# Patient Record
Sex: FEMALE | Race: WHITE | NOT HISPANIC OR LATINO | Employment: OTHER | URBAN - METROPOLITAN AREA
[De-identification: names, ages, dates, MRNs, and addresses within clinical notes are randomized per-mention and may not be internally consistent; named-entity substitution may affect disease eponyms.]

---

## 2017-03-10 ENCOUNTER — HISTORICAL (OUTPATIENT)
Dept: RADIOLOGY | Facility: HOSPITAL | Age: 57
End: 2017-03-10

## 2018-07-17 ENCOUNTER — HISTORICAL (OUTPATIENT)
Dept: ADMINISTRATIVE | Facility: HOSPITAL | Age: 58
End: 2018-07-17

## 2018-07-17 LAB
BUN SERPL-MCNC: 22 MG/DL (ref 7–18)
CALCIUM SERPL-MCNC: 9 MG/DL (ref 8.5–10.1)
CHLORIDE SERPL-SCNC: 108 MMOL/L (ref 98–107)
CO2 SERPL-SCNC: 27 MMOL/L (ref 21–32)
CREAT SERPL-MCNC: 0.9 MG/DL (ref 0.6–1.3)
CREAT/UREA NIT SERPL: 24
ERYTHROCYTE [DISTWIDTH] IN BLOOD BY AUTOMATED COUNT: 11.8 % (ref 11.5–14.5)
GLUCOSE SERPL-MCNC: 72 MG/DL (ref 74–106)
HCT VFR BLD AUTO: 43.6 % (ref 35–46)
HGB BLD-MCNC: 14.9 GM/DL (ref 12–16)
MCH RBC QN AUTO: 33.8 PG (ref 26–34)
MCHC RBC AUTO-ENTMCNC: 34.2 GM/DL (ref 31–37)
MCV RBC AUTO: 98.9 FL (ref 80–100)
PLATELET # BLD AUTO: 286 X10(3)/MCL (ref 130–400)
PMV BLD AUTO: 11.7 FL (ref 7.4–10.4)
POTASSIUM SERPL-SCNC: 4.2 MMOL/L (ref 3.5–5.1)
RBC # BLD AUTO: 4.41 X10(6)/MCL (ref 4–5.2)
SODIUM SERPL-SCNC: 141 MMOL/L (ref 136–145)
WBC # SPEC AUTO: 9 X10(3)/MCL (ref 4.5–11)

## 2018-07-23 ENCOUNTER — HISTORICAL (OUTPATIENT)
Dept: SURGERY | Facility: HOSPITAL | Age: 58
End: 2018-07-23

## 2018-07-23 LAB
AMPHET UR QL SCN: NEGATIVE
BARBITURATE SCN PRESENT UR: NEGATIVE
BENZODIAZ UR QL SCN: POSITIVE
CANNABINOIDS UR QL SCN: POSITIVE
COCAINE UR QL SCN: POSITIVE
OPIATES UR QL SCN: POSITIVE
PCP UR QL: NEGATIVE

## 2018-10-26 ENCOUNTER — HISTORICAL (OUTPATIENT)
Dept: LAB | Facility: HOSPITAL | Age: 58
End: 2018-10-26

## 2019-09-27 ENCOUNTER — HISTORICAL (OUTPATIENT)
Dept: ENDOSCOPY | Facility: HOSPITAL | Age: 59
End: 2019-09-27

## 2022-04-10 ENCOUNTER — HISTORICAL (OUTPATIENT)
Dept: ADMINISTRATIVE | Facility: HOSPITAL | Age: 62
End: 2022-04-10

## 2022-04-25 VITALS
OXYGEN SATURATION: 96 % | HEIGHT: 66 IN | SYSTOLIC BLOOD PRESSURE: 120 MMHG | WEIGHT: 165.38 LBS | BODY MASS INDEX: 26.58 KG/M2 | DIASTOLIC BLOOD PRESSURE: 79 MMHG

## 2022-05-03 NOTE — HISTORICAL [facility] CERNER
This is a historical note converted from Kristyn. Formatting and pictures may have been removed.  Please reference Kristyn for original formatting and attached multimedia. Interval H&P  Patient examined and chart reviewed.? No changes to the H&P below.? Ok to proceed with surgery.  ?   Margie Stephen  General Surgery, PGY-1  ?   Chief Complaint  Referred for hernia  History of Present Illness  58 year old female presents to clinic as a referral from [facility] ED for evaluation of ventral hernia. Patient states that she first noticed the hernia ~2 years ago. It was causing her minimal symptoms at that time. Recently she has started to be more tender in the area of the hernia and it is more difficult to reduce than before. She was evaluated in the [facility] ED last month, where a CT scan was obtained showing?a hernia containing inflamed omentum and a 1.5 cm defect. ?She was evaluated?by the surgical team, and outpatient evaluation for repair was recommended. She denies nausea/vomiting or changes in BMs. No fever chills. She has never had a colonoscopy.  Review of Systems  10 point ROS negative other than stated above in HPI  Physical Exam  ???Vitals & Measurements  ??T:?36.7? ?C (Oral)? HR:?71(Peripheral)? RR:?16? BP:?120/79? WT:?73.5?kg? WT:?73.5?kg?  Gen: A&Ox3, NAD  HEENT: NCAT  CV: RRR  Resp: Mild expiratory wheezes bilaterally. Non-labored breathing on room air. No use of accessory muscles.  Abd: Soft, ND. Epigastric hernia, reducible, with overlying tenderness to palpation. No associated skin changes. Defect <2 cm.  Ext: No edema.  Skin: Well healed laparoscopic incisions from cholecystectomy. No rashes, wounds, or other lesions.  Neuro: No focal deficits.  Assessment/Plan  ?   58 year old female with reducible epigastric hernia  - Plan for open VHR on 7/23/18  - Patient with reported hx of COPD that seems to be well controlled with minimal symptoms. She currently does not use any inhalers and has never been on home  oxygen. I do not believe that she needs any invasive pulmonary pre op work up like PFTs.  - She is still smoking but working on cutting back. ?I discussed increased risk of hernia recurrence with the patient as a smoker, and she expressed understanding.  - Informed consent obtained in clinic. Risks/benefits/alternatives to surgery discussed.  - Patient has never had a colonoscopy. She has 2 first degree relatives (sister and father) with hx of colon polyps. GI referral made for colonoscopy.  ?   Jocelin Perry MD  [employer] General Surgery PGY-3 Problem List/Past Medical History  ??Ongoing  ??Acid reflux  ??COPD  ??Generalized headaches  ??Generalized osteoarthritis  ??Tobacco user  ??Urinary frequency  ?Historical  ??Meningitis  Procedure/Surgical History  back surgery, choleycystectomy, Ganglion cyst removal from Rt wrist, neck surgery, spinal tap, Tubal pregnancy, Tumor removal from her back x 2, wisdom teeth removal.  Medications  ?Inpatient  ??No active inpatient medications  ?Home  ??AMLODIPINE BESYLATE 10 MG TAB, 10 mg= 1 tab(s), Oral, Daily  ??amoxicillin 875 mg oral tablet, 875 mg= 1 tab(s), Oral, BID,? ?Not Taking, Completed Rx  ??Colace 100 mg oral capsule, 100 mg= 1 cap(s), Oral, BID, PRN,? ?Not taking: taking OTC  ??DIAZEPAM 10 MG TABLET  ??gabapentin 600 mg oral tablet, 600 mg= 1 tab(s), Oral, TID  ??HYDROCODONE-ACETAMIN  MG,? ?Not taking  ??HYDROCODONE-ACETAMIN 7.5-325  ??Lorcet 10/650 oral tablet, 1 tab(s), Oral, q4hr, PRN,? ?Not taking  Allergies  NSAIDs?(swelling)  Social History  ??Alcohol - Medium Risk, 06/09/2012  ???Past, Liquor, 1-2 times per week, 07/10/2018  ??Employment/School - Low Risk, 06/10/2012  ???Employed, Work/School description: ., 06/10/2012  ??Exercise - Occasional exercise, 06/10/2012  ??Home/Environment  ???Lives with Spouse. Living situation: Home/Independent. Financial concerns: Yes., 06/10/2012  ??Nutrition/Health  ???Regular, 06/10/2012  ??Substance Abuse - Denies  Substance Abuse, 06/09/2012  ???Never, 07/10/2018  ??Tobacco - High Risk, 06/09/2012  ???Current every day smoker Use:. Cigarettes Type:. 20 per day., 07/10/2018  Family History  ??Diabetes mellitus type 2: Sister.  ??Heart disease: Sister.  Lab Results  ?   labs from ER visit on 6/15 reviewed - no concerning abnormalities  Diagnostic Results  CT abdomen/pelvis - 1.5 cm periumbilical hernia containing omentum. There is mild  inflammatory change. Early fatty necrosis is not excluded. Of note  this is unchanged since 2016.  ?

## 2022-05-03 NOTE — HISTORICAL [facility] CERNER
This is a historical note converted from Cerjewel. Formatting and pictures may have been removed.  Please reference Kristyn for original formatting and attached multimedia. Chief Complaint  screening colonoscopy  History of Present Illness  59-year-old female is referred for screening colonoscopy. ?She has never had a colonoscopy, never performed FIT testing. ?She has a family history of colon polyps in her mother and sister. ?No history of malignancy.? She is currently asymptomatic denies?abdominal pain, nausea, vomiting, weight loss, bloody or melanotic stools, change in bowel habits.? Past medical history includes COPD, currently not on any treatment?can walk several blocks before getting out of breath. ?Past surgical history includes cholecystectomy, umbilical hernia repair  Review of Systems  Negative except as per HPI  Physical Exam  Vitals & Measurements  T:?36.9? ?C (Oral)? HR:?66(Monitored)? RR:?18? BP:?130/80? SpO2:?98%? WT:?71.5?kg? BMI:?24.74?  General:?WD/WN, NAD, AAO x 3  Neck: supple, full ROM  Respiratory:?normal WOB on room air, LCTAB  CV:?RRR no m/r/g  ABD:?soft, nt/nd  Neurologic: cranial nerves 2-12 grossly?intact  Ext: no c/c/e  ?  Assessment/Plan  59-year-old female with family history of colon polyps, has never had a colonoscopy  -Proceed with colonoscopy as scheduled   Problem List/Past Medical History  Ongoing  COPD  CTS - Carpal tunnel syndrome  Degenerative disc disease  Generalized headaches  Hepatitis B  OA - Osteoarthritis  Sciatica  Seasonal allergy  Tobacco user  Urinary frequency  Urinary incontinence  Ventral hernia  Historical  Meningitis  Procedure/Surgical History  Hernia Repair Ventral (None) (07/23/2018)  Repair Abdominal Wall, Open Approach (07/23/2018)  Repair initial incisional or ventral hernia; reducible (07/23/2018)  L-SPINE FUSION (01/30/2018)  C-SPINE FUSION (08/23/2017)  choleycystectomy  Ganglion cyst removal from Rt wrist  spinal tap  Tubal pregnancy  Tumor removal from  her back x 3  wisdom teeth removal   Medications  Inpatient  buffered lidocaine 2% - 0.5 ml syringe, 10 mg= 0.5 mL, Subcutaneous, As Directed  IVF Lactated Ringers LR Infusion 1,000 mL, 1000 mL, IV  Home  AMLODIPINE BESYLATE 10 MG TAB, 10 mg= 1 tab(s), Oral, Daily  Azo-Cranberry oral tablet  DIAZEPAM 10 MG TABLET  Flonase 50 mcg/inh nasal spray, 1 spray(s), Nasal, BID  gabapentin 600 mg oral tablet, 600 mg= 1 tab(s), Oral, TID  guaiFENesin 400 mg oral tablet, 400 mg= 1 tab(s), Oral, q4hr, PRN  HYDROCODONE-ACETAMIN 7.5-325  Melatonin 3 mg oral tablet, 3 mg= 1 tab(s), Oral, Once a day (at bedtime), PRN  Tylenol 8 HR Arthritis Pain 650 mg oral tablet, extended release, 1300 mg= 2 tab(s), Oral, q8hr, PRN  Allergies  NSAIDs?(swelling)  Social History  Abuse/Neglect  No, 09/27/2019  Alcohol - Medium Risk, 06/09/2012  Past, Liquor, 1-2 times per week, 07/10/2018  Employment/School - Low Risk, 06/10/2012  Employed, Work/School description: ., 06/10/2012  Exercise - Occasional exercise, 06/10/2012  Home/Environment  Lives with Spouse. Living situation: Home/Independent. Financial concerns: Yes., 06/10/2012  Nutrition/Health  Regular, 06/10/2012  Substance Use - Denies Substance Abuse, 06/09/2012  Never, 07/10/2018  Tobacco - High Risk, 06/09/2012  10 or more cigarettes (1/2 pack or more)/day in last 30 days, No, 09/27/2019  Family History  Diabetes mellitus type 2: Sister.  Heart disease: Sister.

## 2022-05-03 NOTE — HISTORICAL [facility] CERNER
This is a historical note converted from Cerner. Formatting and pictures may have been removed.  Please reference Cerjewel for original formatting and attached multimedia. Indication for Surgery  59-year-old female family history of polyps, has never had a colonoscopy  Preoperative Diagnosis  ?Screening colonoscopy  Postoperative Diagnosis  ascending colon polyp  Operation  Colonoscopy  Polypectomy  Surgeon(s)  MD Evelia Turk MD, PGY 2  Anesthesia  MAC  Estimated Blood Loss  2 cc  Findings  5 mm flat?sessile polyp?in the ascending colon  Specimen(s)  Ascending colon polyp  Complications  None  Technique  The risks and benefits of the procedure were discussed and proper consents obtained. ?The patient was brought to the endoscopy suite and anesthesia was induced. ?A digital rectal exam was performed and revealed good sphincter tone, no palpable masses or lesions, no gross blood. ?The endoscope was then inserted and advanced to the level of cecum. ?The appendiceal orifice and the ileocecal valve were identified. ?The scope was then withdrawn and the walls of the?a sending, transverse, descending, sigmoid colon and rectum were visualized. ?Of note a 5 mm flat sessile polyp was identified in the ascending colon, removed with biopsy forceps. ?The?site was inspected and found to be hemostatic.? Scope was then withdrawn the rest of the way. ?Retroflexion showed normal-appearing hemorrhoidal?complex.? Patient was then awakened from anesthesia?having suffered no untoward went.  ?  Recommendations:  Follow-up pathology  Repeat colonoscopy in 5 years

## 2022-05-03 NOTE — HISTORICAL [facility] CERNER
This is a historical note converted from Kristyn. Formatting and pictures may have been removed.  Please reference Kristyn for original formatting and attached multimedia. Name: Jo Ann Swan  MRN: [id number]  Date of Service: 7/23/2018  ?   Service: General Surgery  ?   Attending: Zuleyka Sullivan MD  ?   Primary Surgeon: Margie Stephen, DONNA1; DONNA Goodrich3  ?  Preoperative Diagnosis: ventral hernia  ?  Postoperative Diagnosis: same  ?  Procedure: Open ventral hernia repair  ?  Anesthesia: general  ?  EBL: 3cc  ?  Specimens: none  ?  Drains/packing: none  ?  Implant/Devices: none  ?  Procedure in detail:  After adequate anesthesia was achieved, the patient was appropriately prepped and draped in the standard sterile fashion.? A timeout was called to confirm the proper patient, proper site, proper operation, and proper preoperative medications.? A 2cm incision was made over the hernia defect and Bovie electrocautery was used to dissect down to the level of the fascia.? The hernia defect was identified and the contents were reduced back into the abdomen.? We then?dissected the subcutaneous tissue 1cm circumferentially around the hernia defect?from the fascia.? Once the fascia was cleared off, the hernia defect was closed in the vest over pants method using 0-0 Ethibond suture.? Once the defect was closed, the?wound was irrigated and found to be hemostatic.??The incision was then closed with 3-0 Vicryl deep dermal stitches and 4-0?PDS subcuticular stitch.? Dermabond was applied over the incision.? At the end of the procedure, all lap, sponge, and instrument counts were correct.? Patient tolerated the procedure well without any obvious complications and was extubated and transferred to the recovery room.  ?  Dr Sullivan was present for the entire duration of the procedure.  ?  Margie Stephen  General Surgery, HO1   I agree with resident documentation. I was physically present, supervised resident, ?and discussed plan  of care.

## 2023-10-11 ENCOUNTER — HOSPITAL ENCOUNTER (INPATIENT)
Facility: HOSPITAL | Age: 63
LOS: 2 days | Discharge: HOME HEALTH | DRG: 065 | End: 2023-10-13
Attending: EMERGENCY MEDICINE | Admitting: INTERNAL MEDICINE
Payer: MEDICAID

## 2023-10-11 DIAGNOSIS — I10 BENIGN ESSENTIAL HTN: ICD-10-CM

## 2023-10-11 DIAGNOSIS — I63.9 CVA (CEREBRAL VASCULAR ACCIDENT): ICD-10-CM

## 2023-10-11 DIAGNOSIS — Z72.0 TOBACCO ABUSE: ICD-10-CM

## 2023-10-11 DIAGNOSIS — G43.609 PERSISTENT MIGRAINE AURA WITH CEREBRAL INFARCTION AND WITHOUT STATUS MIGRAINOSUS, NOT INTRACTABLE: ICD-10-CM

## 2023-10-11 DIAGNOSIS — I63.9 CEREBROVASCULAR ACCIDENT (CVA), UNSPECIFIED MECHANISM: Primary | ICD-10-CM

## 2023-10-11 DIAGNOSIS — I63.9 PERSISTENT MIGRAINE AURA WITH CEREBRAL INFARCTION AND WITHOUT STATUS MIGRAINOSUS, NOT INTRACTABLE: ICD-10-CM

## 2023-10-11 LAB
ALBUMIN SERPL-MCNC: 4.1 G/DL (ref 3.4–4.8)
ALBUMIN/GLOB SERPL: 1.2 RATIO (ref 1.1–2)
ALP SERPL-CCNC: 121 UNIT/L (ref 40–150)
ALT SERPL-CCNC: 94 UNIT/L (ref 0–55)
APPEARANCE UR: ABNORMAL
AST SERPL-CCNC: 60 UNIT/L (ref 5–34)
AV INDEX (PROSTH): 0.69
AV MEAN GRADIENT: 5 MMHG
AV PEAK GRADIENT: 9 MMHG
AV VALVE AREA BY VELOCITY RATIO: 1.93 CM²
AV VALVE AREA: 1.94 CM²
AV VELOCITY RATIO: 0.68
BACTERIA #/AREA URNS AUTO: ABNORMAL /HPF
BASOPHILS # BLD AUTO: 0.02 X10(3)/MCL
BASOPHILS NFR BLD AUTO: 0.2 %
BILIRUB SERPL-MCNC: 0.4 MG/DL
BILIRUB UR QL STRIP.AUTO: NEGATIVE
BSA FOR ECHO PROCEDURE: 1.85 M2
BUN SERPL-MCNC: 9.4 MG/DL (ref 9.8–20.1)
CALCIUM SERPL-MCNC: 9.9 MG/DL (ref 8.4–10.2)
CHLORIDE SERPL-SCNC: 104 MMOL/L (ref 98–107)
CHOLEST SERPL-MCNC: 201 MG/DL
CHOLEST/HDLC SERPL: 5 {RATIO} (ref 0–5)
CO2 SERPL-SCNC: 28 MMOL/L (ref 23–31)
COLOR UR AUTO: ABNORMAL
CREAT SERPL-MCNC: 0.84 MG/DL (ref 0.55–1.02)
CV ECHO LV RWT: 0.44 CM
DOP CALC AO PEAK VEL: 1.48 M/S
DOP CALC AO VTI: 30.8 CM
DOP CALC LVOT AREA: 2.8 CM2
DOP CALC LVOT DIAMETER: 1.9 CM
DOP CALC LVOT PEAK VEL: 1.01 M/S
DOP CALC LVOT STROKE VOLUME: 59.79 CM3
DOP CALC MV VTI: 31.2 CM
DOP CALCLVOT PEAK VEL VTI: 21.1 CM
E WAVE DECELERATION TIME: 259 MSEC
E/A RATIO: 1.06
E/E' RATIO: 9.47 M/S
ECHO LV POSTERIOR WALL: 0.97 CM (ref 0.6–1.1)
EOSINOPHIL # BLD AUTO: 0.07 X10(3)/MCL (ref 0–0.9)
EOSINOPHIL NFR BLD AUTO: 0.7 %
ERYTHROCYTE [DISTWIDTH] IN BLOOD BY AUTOMATED COUNT: 12.3 % (ref 11.5–17)
FLUAV AG UPPER RESP QL IA.RAPID: NOT DETECTED
FLUBV AG UPPER RESP QL IA.RAPID: NOT DETECTED
FRACTIONAL SHORTENING: 31 % (ref 28–44)
GFR SERPLBLD CREATININE-BSD FMLA CKD-EPI: >60 MLS/MIN/1.73/M2
GLOBULIN SER-MCNC: 3.3 GM/DL (ref 2.4–3.5)
GLUCOSE SERPL-MCNC: 112 MG/DL (ref 82–115)
GLUCOSE UR QL STRIP.AUTO: NEGATIVE
HCT VFR BLD AUTO: 44.1 % (ref 37–47)
HDLC SERPL-MCNC: 37 MG/DL (ref 35–60)
HGB BLD-MCNC: 14.8 G/DL (ref 12–16)
IMM GRANULOCYTES # BLD AUTO: 0.04 X10(3)/MCL (ref 0–0.04)
IMM GRANULOCYTES NFR BLD AUTO: 0.4 %
INTERVENTRICULAR SEPTUM: 0.96 CM (ref 0.6–1.1)
KETONES UR QL STRIP.AUTO: NEGATIVE
LDLC SERPL CALC-MCNC: 123 MG/DL (ref 50–140)
LEFT ATRIUM SIZE: 2.6 CM
LEFT ATRIUM VOLUME INDEX MOD: 17.5 ML/M2
LEFT ATRIUM VOLUME MOD: 32.2 CM3
LEFT INTERNAL DIMENSION IN SYSTOLE: 3.04 CM (ref 2.1–4)
LEFT VENTRICLE DIASTOLIC VOLUME INDEX: 47.17 ML/M2
LEFT VENTRICLE DIASTOLIC VOLUME: 86.8 ML
LEFT VENTRICLE MASS INDEX: 76 G/M2
LEFT VENTRICLE SYSTOLIC VOLUME INDEX: 19.7 ML/M2
LEFT VENTRICLE SYSTOLIC VOLUME: 36.2 ML
LEFT VENTRICULAR INTERNAL DIMENSION IN DIASTOLE: 4.38 CM (ref 3.5–6)
LEFT VENTRICULAR MASS: 139.72 G
LEUKOCYTE ESTERASE UR QL STRIP.AUTO: ABNORMAL
LV LATERAL E/E' RATIO: 10 M/S
LV SEPTAL E/E' RATIO: 9 M/S
LVOT MG: 2 MMHG
LVOT MV: 0.67 CM/S
LYMPHOCYTES # BLD AUTO: 3.21 X10(3)/MCL (ref 0.6–4.6)
LYMPHOCYTES NFR BLD AUTO: 30.1 %
MCH RBC QN AUTO: 33.3 PG (ref 27–31)
MCHC RBC AUTO-ENTMCNC: 33.6 G/DL (ref 33–36)
MCV RBC AUTO: 99.1 FL (ref 80–94)
MONOCYTES # BLD AUTO: 0.8 X10(3)/MCL (ref 0.1–1.3)
MONOCYTES NFR BLD AUTO: 7.5 %
MV MEAN GRADIENT: 2 MMHG
MV PEAK A VEL: 0.85 M/S
MV PEAK E VEL: 0.9 M/S
MV PEAK GRADIENT: 4 MMHG
MV STENOSIS PRESSURE HALF TIME: 68 MS
MV VALVE AREA BY CONTINUITY EQUATION: 1.92 CM2
MV VALVE AREA P 1/2 METHOD: 3.24 CM2
NEUTROPHILS # BLD AUTO: 6.54 X10(3)/MCL (ref 2.1–9.2)
NEUTROPHILS NFR BLD AUTO: 61.1 %
NITRITE UR QL STRIP.AUTO: NEGATIVE
NRBC BLD AUTO-RTO: 0 %
OHS LV EJECTION FRACTION SIMPSONS BIPLANE MOD: 58 %
PH UR STRIP.AUTO: 6.5 [PH]
PISA TR MAX VEL: 1.45 M/S
PLATELET # BLD AUTO: 297 X10(3)/MCL (ref 130–400)
PMV BLD AUTO: 10 FL (ref 7.4–10.4)
POCT GLUCOSE: 96 MG/DL (ref 70–110)
POTASSIUM SERPL-SCNC: 4.6 MMOL/L (ref 3.5–5.1)
PROT SERPL-MCNC: 7.4 GM/DL (ref 5.8–7.6)
PROT UR QL STRIP.AUTO: ABNORMAL
PV PEAK GRADIENT: 3 MMHG
PV PEAK VELOCITY: 0.87 M/S
RA PRESSURE ESTIMATED: 3 MMHG
RBC # BLD AUTO: 4.45 X10(6)/MCL (ref 4.2–5.4)
RBC #/AREA URNS AUTO: 8 /HPF
RBC UR QL AUTO: ABNORMAL
RV TB RVSP: 4 MMHG
SARS-COV-2 RNA RESP QL NAA+PROBE: NOT DETECTED
SODIUM SERPL-SCNC: 141 MMOL/L (ref 136–145)
SP GR UR STRIP.AUTO: 1.02 (ref 1–1.03)
SQUAMOUS #/AREA URNS AUTO: <5 /HPF
TDI LATERAL: 0.09 M/S
TDI SEPTAL: 0.1 M/S
TDI: 0.1 M/S
TR MAX PG: 8 MMHG
TRICUSPID ANNULAR PLANE SYSTOLIC EXCURSION: 2.17 CM
TRIGL SERPL-MCNC: 203 MG/DL (ref 37–140)
TSH SERPL-ACNC: 1.84 UIU/ML (ref 0.35–4.94)
TV REST PULMONARY ARTERY PRESSURE: 11 MMHG
UROBILINOGEN UR STRIP-ACNC: 1
VLDLC SERPL CALC-MCNC: 41 MG/DL
WBC # SPEC AUTO: 10.68 X10(3)/MCL (ref 4.5–11.5)
WBC #/AREA URNS AUTO: 754 /HPF
Z-SCORE OF LEFT VENTRICULAR DIMENSION IN END DIASTOLE: -1.53
Z-SCORE OF LEFT VENTRICULAR DIMENSION IN END SYSTOLE: -0.28

## 2023-10-11 PROCEDURE — 25000003 PHARM REV CODE 250: Performed by: INTERNAL MEDICINE

## 2023-10-11 PROCEDURE — 99223 1ST HOSP IP/OBS HIGH 75: CPT | Mod: ,,, | Performed by: PSYCHIATRY & NEUROLOGY

## 2023-10-11 PROCEDURE — 99223 PR INITIAL HOSPITAL CARE,LEVL III: ICD-10-PCS | Mod: ,,, | Performed by: PSYCHIATRY & NEUROLOGY

## 2023-10-11 PROCEDURE — 93010 EKG 12-LEAD: ICD-10-PCS | Mod: ,,, | Performed by: INTERNAL MEDICINE

## 2023-10-11 PROCEDURE — 84443 ASSAY THYROID STIM HORMONE: CPT | Performed by: NURSE PRACTITIONER

## 2023-10-11 PROCEDURE — 99285 EMERGENCY DEPT VISIT HI MDM: CPT | Mod: 25

## 2023-10-11 PROCEDURE — 93005 ELECTROCARDIOGRAM TRACING: CPT

## 2023-10-11 PROCEDURE — 63600175 PHARM REV CODE 636 W HCPCS: Performed by: EMERGENCY MEDICINE

## 2023-10-11 PROCEDURE — 92523 SPEECH SOUND LANG COMPREHEN: CPT

## 2023-10-11 PROCEDURE — 25500020 PHARM REV CODE 255: Performed by: INTERNAL MEDICINE

## 2023-10-11 PROCEDURE — 81001 URINALYSIS AUTO W/SCOPE: CPT | Performed by: STUDENT IN AN ORGANIZED HEALTH CARE EDUCATION/TRAINING PROGRAM

## 2023-10-11 PROCEDURE — 80061 LIPID PANEL: CPT | Performed by: NURSE PRACTITIONER

## 2023-10-11 PROCEDURE — S4991 NICOTINE PATCH NONLEGEND: HCPCS | Performed by: EMERGENCY MEDICINE

## 2023-10-11 PROCEDURE — 0240U COVID/FLU A&B PCR: CPT | Performed by: EMERGENCY MEDICINE

## 2023-10-11 PROCEDURE — 11000001 HC ACUTE MED/SURG PRIVATE ROOM

## 2023-10-11 PROCEDURE — 87088 URINE BACTERIA CULTURE: CPT | Performed by: STUDENT IN AN ORGANIZED HEALTH CARE EDUCATION/TRAINING PROGRAM

## 2023-10-11 PROCEDURE — 25000003 PHARM REV CODE 250: Performed by: NURSE PRACTITIONER

## 2023-10-11 PROCEDURE — 80053 COMPREHEN METABOLIC PANEL: CPT | Performed by: STUDENT IN AN ORGANIZED HEALTH CARE EDUCATION/TRAINING PROGRAM

## 2023-10-11 PROCEDURE — 85025 COMPLETE CBC W/AUTO DIFF WBC: CPT | Performed by: STUDENT IN AN ORGANIZED HEALTH CARE EDUCATION/TRAINING PROGRAM

## 2023-10-11 PROCEDURE — 63600175 PHARM REV CODE 636 W HCPCS: Performed by: INTERNAL MEDICINE

## 2023-10-11 PROCEDURE — 93010 ELECTROCARDIOGRAM REPORT: CPT | Mod: ,,, | Performed by: INTERNAL MEDICINE

## 2023-10-11 PROCEDURE — 87077 CULTURE AEROBIC IDENTIFY: CPT | Performed by: STUDENT IN AN ORGANIZED HEALTH CARE EDUCATION/TRAINING PROGRAM

## 2023-10-11 PROCEDURE — 25000003 PHARM REV CODE 250: Performed by: EMERGENCY MEDICINE

## 2023-10-11 RX ORDER — HYDRALAZINE HYDROCHLORIDE 20 MG/ML
10 INJECTION INTRAMUSCULAR; INTRAVENOUS EVERY 4 HOURS PRN
Status: DISCONTINUED | OUTPATIENT
Start: 2023-10-11 | End: 2023-10-13 | Stop reason: HOSPADM

## 2023-10-11 RX ORDER — LABETALOL HYDROCHLORIDE 5 MG/ML
10 INJECTION, SOLUTION INTRAVENOUS EVERY 4 HOURS PRN
Status: DISCONTINUED | OUTPATIENT
Start: 2023-10-11 | End: 2023-10-13 | Stop reason: HOSPADM

## 2023-10-11 RX ORDER — DIAZEPAM 5 MG/1
5 TABLET ORAL
Status: COMPLETED | OUTPATIENT
Start: 2023-10-11 | End: 2023-10-11

## 2023-10-11 RX ORDER — ATORVASTATIN CALCIUM 40 MG/1
40 TABLET, FILM COATED ORAL NIGHTLY
Status: DISCONTINUED | OUTPATIENT
Start: 2023-10-11 | End: 2023-10-13 | Stop reason: HOSPADM

## 2023-10-11 RX ORDER — HYDROCODONE BITARTRATE AND ACETAMINOPHEN 7.5; 325 MG/1; MG/1
1 TABLET ORAL 2 TIMES DAILY PRN
Status: DISCONTINUED | OUTPATIENT
Start: 2023-10-11 | End: 2023-10-13 | Stop reason: HOSPADM

## 2023-10-11 RX ORDER — ASPIRIN 81 MG/1
81 TABLET ORAL DAILY
Status: DISCONTINUED | OUTPATIENT
Start: 2023-10-11 | End: 2023-10-12

## 2023-10-11 RX ORDER — LABETALOL HYDROCHLORIDE 5 MG/ML
10 INJECTION, SOLUTION INTRAVENOUS EVERY 6 HOURS PRN
Status: DISCONTINUED | OUTPATIENT
Start: 2023-10-11 | End: 2023-10-11

## 2023-10-11 RX ORDER — AMLODIPINE BESYLATE 10 MG/1
10 TABLET ORAL DAILY
COMMUNITY
Start: 2023-08-04 | End: 2023-11-14

## 2023-10-11 RX ORDER — GABAPENTIN 600 MG/1
600 TABLET ORAL 3 TIMES DAILY
COMMUNITY
Start: 2023-10-05 | End: 2023-12-11 | Stop reason: SDUPTHER

## 2023-10-11 RX ORDER — ONDANSETRON 2 MG/ML
4 INJECTION INTRAMUSCULAR; INTRAVENOUS EVERY 4 HOURS PRN
Status: DISCONTINUED | OUTPATIENT
Start: 2023-10-12 | End: 2023-10-13 | Stop reason: HOSPADM

## 2023-10-11 RX ORDER — DIAZEPAM 10 MG/1
10-15 TABLET ORAL
COMMUNITY
Start: 2023-10-05

## 2023-10-11 RX ORDER — AMLODIPINE BESYLATE 5 MG/1
10 TABLET ORAL DAILY
Status: DISCONTINUED | OUTPATIENT
Start: 2023-10-12 | End: 2023-10-13 | Stop reason: HOSPADM

## 2023-10-11 RX ORDER — GABAPENTIN 300 MG/1
600 CAPSULE ORAL 3 TIMES DAILY
Status: DISCONTINUED | OUTPATIENT
Start: 2023-10-11 | End: 2023-10-13 | Stop reason: HOSPADM

## 2023-10-11 RX ORDER — DIPHENHYDRAMINE HYDROCHLORIDE 50 MG/ML
12.5 INJECTION INTRAMUSCULAR; INTRAVENOUS
Status: COMPLETED | OUTPATIENT
Start: 2023-10-11 | End: 2023-10-11

## 2023-10-11 RX ORDER — PROCHLORPERAZINE EDISYLATE 5 MG/ML
10 INJECTION INTRAMUSCULAR; INTRAVENOUS
Status: COMPLETED | OUTPATIENT
Start: 2023-10-11 | End: 2023-10-11

## 2023-10-11 RX ORDER — HYDROCODONE BITARTRATE AND ACETAMINOPHEN 7.5; 325 MG/1; MG/1
1 TABLET ORAL 2 TIMES DAILY
COMMUNITY
Start: 2023-10-05

## 2023-10-11 RX ORDER — ENOXAPARIN SODIUM 100 MG/ML
40 INJECTION SUBCUTANEOUS EVERY 24 HOURS
Status: DISCONTINUED | OUTPATIENT
Start: 2023-10-11 | End: 2023-10-13 | Stop reason: HOSPADM

## 2023-10-11 RX ORDER — ACETAMINOPHEN 325 MG/1
650 TABLET ORAL EVERY 6 HOURS PRN
Status: DISCONTINUED | OUTPATIENT
Start: 2023-10-11 | End: 2023-10-11

## 2023-10-11 RX ORDER — GABAPENTIN 300 MG/1
600 CAPSULE ORAL 3 TIMES DAILY
Status: DISCONTINUED | OUTPATIENT
Start: 2023-10-12 | End: 2023-10-11

## 2023-10-11 RX ORDER — DIAZEPAM 5 MG/1
10 TABLET ORAL DAILY PRN
Status: DISCONTINUED | OUTPATIENT
Start: 2023-10-11 | End: 2023-10-13 | Stop reason: HOSPADM

## 2023-10-11 RX ORDER — IBUPROFEN 200 MG
1 TABLET ORAL
Status: COMPLETED | OUTPATIENT
Start: 2023-10-11 | End: 2023-10-12

## 2023-10-11 RX ORDER — ACETAMINOPHEN 500 MG
1000 TABLET ORAL EVERY 6 HOURS PRN
Status: DISCONTINUED | OUTPATIENT
Start: 2023-10-12 | End: 2023-10-13 | Stop reason: HOSPADM

## 2023-10-11 RX ORDER — CIPROFLOXACIN 250 MG/1
250 TABLET, FILM COATED ORAL EVERY 12 HOURS
Status: DISCONTINUED | OUTPATIENT
Start: 2023-10-11 | End: 2023-10-13 | Stop reason: HOSPADM

## 2023-10-11 RX ADMIN — PROCHLORPERAZINE EDISYLATE 10 MG: 5 INJECTION INTRAMUSCULAR; INTRAVENOUS at 11:10

## 2023-10-11 RX ADMIN — DIAZEPAM 5 MG: 5 TABLET ORAL at 10:10

## 2023-10-11 RX ADMIN — DIPHENHYDRAMINE HYDROCHLORIDE 12.5 MG: 50 INJECTION INTRAMUSCULAR; INTRAVENOUS at 11:10

## 2023-10-11 RX ADMIN — ATORVASTATIN CALCIUM 40 MG: 40 TABLET, FILM COATED ORAL at 11:10

## 2023-10-11 RX ADMIN — IOPAMIDOL 100 ML: 755 INJECTION, SOLUTION INTRAVENOUS at 01:10

## 2023-10-11 RX ADMIN — SODIUM CHLORIDE 1000 ML: 9 INJECTION, SOLUTION INTRAVENOUS at 11:10

## 2023-10-11 RX ADMIN — GABAPENTIN 600 MG: 300 CAPSULE ORAL at 11:10

## 2023-10-11 RX ADMIN — HYDROCODONE BITARTRATE AND ACETAMINOPHEN 1 TABLET: 7.5; 325 TABLET ORAL at 11:10

## 2023-10-11 RX ADMIN — NICOTINE 1 PATCH: 21 PATCH, EXTENDED RELEASE TRANSDERMAL at 10:10

## 2023-10-11 RX ADMIN — ACETAMINOPHEN 650 MG: 325 TABLET, FILM COATED ORAL at 08:10

## 2023-10-11 RX ADMIN — CIPROFLOXACIN 250 MG: 250 TABLET, COATED ORAL at 11:10

## 2023-10-11 RX ADMIN — ENOXAPARIN SODIUM 40 MG: 40 INJECTION SUBCUTANEOUS at 11:10

## 2023-10-11 RX ADMIN — ASPIRIN 81 MG: 81 TABLET, COATED ORAL at 05:10

## 2023-10-11 NOTE — ASSESSMENT & PLAN NOTE
Stroke - left PCA  - presented with headache, right visual field cut  - Stroke RF: HTN  - Intervention: none  - Etiology: TBD    Stroke workup:  -CTH: Findings suggestive of subacute ischemic changes in the left PCA territory.  -MRI brain: Pending  -LDL:  Collected, results pending   -TSH: Collected, results pending  -not on antiplatelet, anticoagulation, or statin therapy at home      Plan:   -continue stroke workup   -ok to normalize blood pressure (now over 24 hours from symptom onset)  -Neuro checks q4hr ... stat CTh if any neuro change   -Begin ASA 81mg daily .... if failed Wilburn, then ASA 300mg [place] daily  -DVT ppx with SCD or lovenox 40 s/c daily  -Continuous telemetry monitoring  -NPO until passes Wilburn or cleared by SLP  -PT/OT/SLP to evaluate

## 2023-10-11 NOTE — PT/OT/SLP EVAL
[facility]  Speech Language Pathology Department  Cognitive-Communication Evaluation    Patient Name:  Jo Ann Swan   MRN:  [id number]    Recommendations:     General recommendations:  SLP intervention not indicated  Communication strategies:  go to room if call light pushed    Discharge recommendations:  Discharge Facility/Level of Care Needs:  (defer to medical team recommendations)   Barriers to safe discharge: acuity of illness    History:     Jo Ann Swan is a/n 63 y.o. female admitted for a CVA.       Previous level of Function  Education:college  Occupation: unemployed, disabled  Lives: with significant other  Handed: Right  Glasses: no  Hearing Aids: no  Home Responsibilities: parenting/care of others (pets)    Imaging   Results for orders placed during the hospital encounter of 10/11/23    MRI Brain Without Contrast    Narrative  EXAMINATION:  MRI BRAIN WITHOUT CONTRAST    CLINICAL HISTORY:  Stroke, follow up;    TECHNIQUE:  Multiplanar, multisequence MR images of the brain were obtained without the administration of intravenous contrast.    COMPARISON:  CT head dated 10/11/2023    FINDINGS:  There is restricted diffusion predominantly in the left medial occipital lobe and posterior temporal lobe, with small amount in the medial left parietal lobe.  There is no evidence of hemorrhagic transformation.  Mild additional scattered T2/FLAIR hyperintensities in the subcortical and periventricular white matter likely represent chronic microvascular ischemic changes.    There is no mass effect or midline shift.  The basal cisterns are patent.  There is mild diffuse parenchymal volume loss.  There is no hydrocephalus or abnormal extra-axial fluid collection.  The major intracranial flow voids are patent.  There is a right mastoid effusion    Impression  1. Acute left PCA territory infarct without hemorrhage.  2. Mild chronic microvascular ischemic  changes.      Electronically signed by: Denae Gamble  Date:    10/11/2023  Time:    12:54    Subjective     Patient cooperative, flat, and drowsy .    Patient goals: to return home at [facility]     Spiritual/Cultural/Christian Beliefs/Practices that affect care: no  Pain/Comfort: Pain Rating 1: 0/10  Respiratory Status: Room air    Objective:     ORAL MUSCULATURE  Dentition: missing teeth  Facial Movement: WFL  Buccal Strength & Mobility: WFL  Mandibular Strength & Mobility: WFL  Oral Labial Strength & Mobility: WFL  Lingual Strength & Mobility: WFL  Velar Elevation: WFL    SPEECH PRODUCTION  Phoneme Production: adequate  Voice Quality: adequate  Voice Production: adequate  Speech Rate: appropriate  Loudness: acceptable  Respiration: WFL for speech  Resonance: adequate  Prosody: monopitch  Speech Intelligibility  Known Context: Greater that 90%  Unknown Context: Greater that 90%    AUDITORY COMPREHENSION  Identification:  Objects: 100%  Following Directions:  1-Step: 100%  2-Step: 100%  Yes/No Questions:  Biographical: 100%  Environmental: 100%  Simple: 100%  Complex: 100%    VERBAL EXPRESSION  Automatic Speech:  Days of the week: 100%  Months of the year: 100%  Confrontation Naming  Body Parts: 100%  Objects: 100%  Wh- Questions:  Object name: 100%  Object function: 100%    COGNITION  Orientation:  Person: yes  Place: yes  Time: yes  Situation: yes   Attention:  Focused: WFL  Sustained: WFL  Pragmatics:  Eye contact: WFL  Facial expression: WFL  Memory:  Immediate: WFL  Delayed: 1/4 without assistance; 3/4 with assistance (reported baseline)  Long Term: WFL  Problem Solving  Functional simple: WFL  Organization:  Convergent thinking: WFL  Divergent thinking: WFL  Executive Function:  Information processing: WFL  Reasoning: WFL    Assessment:     Pt presents at functional baseline for speech, language, and cognition. Pt and family expressed no concerns at this time. Instructed to inform MD if any concerns present and  SLP will re-evaluate at that time. All questions answered. Will sign off as no further skilled services are warranted at this time.     Patient Education:     Patient provided with verbal education regarding results/recommendations.  Understanding was verbalized.    Plan:     SLP Follow-Up:  No   Plan of Care reviewed with:  patient, family      Time Tracking:     SLP Treatment Date:   10/11/23  Speech Start Time:  1300  Speech Stop Time:  1340     Speech Total Time (min):  40 min    Billable minutes:  Evaluation of Speech Sound Production with Comprehension and Expression, 40 minutes     10/11/2023

## 2023-10-11 NOTE — SUBJECTIVE & OBJECTIVE
History reviewed. No pertinent past medical history.    History reviewed. No pertinent surgical history.    Review of patient's allergies indicates:   Allergen Reactions    Nsaids (non-steroidal anti-inflammatory drug) Swelling       No current facility-administered medications on file prior to encounter.     No current outpatient medications on file prior to encounter.     Family History    None       Tobacco Use    Smoking status: Not on file    Smokeless tobacco: Not on file   Substance and Sexual Activity    Alcohol use: Not on file    Drug use: Not on file    Sexual activity: Not on file     Review of Systems   Eyes:  Positive for visual disturbance.   Neurological:  Positive for headaches. Negative for dizziness, tremors, seizures, syncope, facial asymmetry, speech difficulty, weakness, light-headedness and numbness.   All other systems reviewed and are negative.    Objective:     Vital Signs (Most Recent):  Temp: 98.1 °F (36.7 °C) (10/11/23 0835)  Pulse: 64 (10/11/23 1015)  Resp: 16 (10/11/23 0835)  BP: (!) 176/91 (10/11/23 1015)  SpO2: 95 % (10/11/23 1015) Vital Signs (24h Range):  Temp:  [98.1 °F (36.7 °C)] 98.1 °F (36.7 °C)  Pulse:  [64-66] 64  Resp:  [16] 16  SpO2:  [95 %-96 %] 95 %  BP: (166-176)/(79-91) 176/91     Weight: 72.6 kg (160 lb)  Body mass index is 25.06 kg/m².     Physical Exam  Vitals reviewed.   Constitutional:       General: She is awake. She is not in acute distress.     Appearance: She is not ill-appearing.   HENT:      Head: Normocephalic.   Eyes:      General: Visual field deficit (right visual field cut) present.      Extraocular Movements: Extraocular movements intact.      Pupils: Pupils are equal, round, and reactive to light.   Cardiovascular:      Rate and Rhythm: Normal rate and regular rhythm.   Pulmonary:      Effort: Pulmonary effort is normal.   Skin:     General: Skin is warm and dry.      Capillary Refill: Capillary refill takes less than 2 seconds.   Neurological:       "Mental Status: She is alert and oriented to person, place, and time.      Cranial Nerves: No dysarthria or facial asymmetry.      Sensory: Sensation is intact.      Motor: Motor function is intact.      Coordination: Coordination is intact.   Psychiatric:         Attention and Perception: Attention normal.         Speech: Speech normal.         Behavior: Behavior normal. Behavior is cooperative.        Significant Labs: Hemoglobin A1c: No results for input(s): "HGBA1C" in the last 720 hours.  BMP:   Recent Labs   Lab 10/11/23  0901      K 4.6   CO2 28   BUN 9.4*   CREATININE 0.84   CALCIUM 9.9     CBC:   Recent Labs   Lab 10/11/23  0901   WBC 10.68   HGB 14.8   HCT 44.1        Inflammatory Markers: No results for input(s): "SEDRATE", "CRP", "PROCAL" in the last 48 hours.    Significant Imaging: I have reviewed all pertinent imaging results/findings within the past 24 hours.    "

## 2023-10-11 NOTE — HPI
63-year-old female with past medical history of COPD, GERD, OA, chronic back pain, presented to ED on 10/11 with reports of headache and earache x2 days followed by vision change yesterday.  She reported sudden vision loss in her right eye yesterday (10/10) morning.  She denied slurred speech, facial droop, or unilateral weakness. CTh showed subacute ischemic changes in the left PCA territory.  She was admitted to hospitalist service and Neurology was consulted for stroke workup.

## 2023-10-11 NOTE — H&P
"[facility]  Hospital Medicine History & Physical Examination       Patient Name: Jo Ann Swan  MRN: [id number]  Patient Class: IP- Inpatient   Admission Date: 10/11/2023   Admitting Service: Hospital Medicine   Length of Stay: 0  Attending Physician: Jose Anderson MD  Primary Care Provider: Mesfin Sousa MD  Face-to-Face encounter date: 10/11/2023  Code Status: Full  Chief Complaint: Headache (C/o headache & left earache x2 days. States numbness to R hand & decreased peripheral vision to R eye that started at 1100 yesterday. No slurred speech, weakness or facial droop noted in triage. CBG 96. States "feels similar to the spinal meningitis I had 8 years ago." )      Patient information was obtained from patient, patient's family, past medical records and ER records.      HISTORY OF PRESENT ILLNESS:   Jo Ann Swan is a 63 y.o. female with a PMHx of COPD, GERD, oa, chronic back pain, history of meningitis who presented to [facility] on 10/11/2023 with c/o left-sided headache and earache x2 days.  She also endorsed right hand numbness and loss of vision in the right eye that began around 11:00 a.m. on 10/10/2023.  She denied any slurred speech, weakness, facial droop.    Initial ED VS include /79, HR 66, RR 16, SpO2 96%, temperature 98.1° F.  Labs essentially unremarkable.  Urinalysis with 1+ protein, 1+ occult blood, 3+ leukocytes, 8 RBCs, 754 WBCs and 4+ bacteria.  Urine cultures pending.  CT head without contrast suggestive of subacute ischemic changes in the left PCA territory.  Neurology was consulted.  Admitted to hospital medicine services for further CVA workup.      REVIEW OF SYSTEMS:   Except as documented, all other systems reviewed and negative     PAST MEDICAL HISTORY:   COPD  Hx of Meningitis  GERD  OA  Chronic Back Pain    PAST SURGICAL HISTORY:   Spinal fusion   Cervical spinal fusion   Cholecystectomy   [place] teeth extraction   Right wrist ganglion " cyst removal   Lumbar puncture  Ventral hernia repair    FAMILY HISTORY:   Reviewed and negative    SOCIAL HISTORY:   Denied alcohol, tobacco or illicit drug use    ALLERGIES:   Nsaids (non-steroidal anti-inflammatory drug)    HOME MEDICATIONS:     Prior to Admission medications    Not on File     ________________________________________________________________________  INPATIENT LIST OF MEDICATIONS     Current Facility-Administered Medications:     diphenhydrAMINE injection 12.5 mg, 12.5 mg, Intravenous, ED 1 Time, Zoila Rico MD    labetaloL injection 10 mg, 10 mg, Intravenous, Q6H PRN, Lisa Felipe, AGACNP-BC    nicotine 21 mg/24 hr 1 patch, 1 patch, Transdermal, ED 1 Time, Zoila Rico MD, 1 patch at 10/11/23 1046    prochlorperazine injection Soln 10 mg, 10 mg, Intravenous, ED 1 Time, Zoila Rico MD    sodium chloride 0.9% bolus 1,000 mL 1,000 mL, 1,000 mL, Intravenous, ED 1 Time, Zoila Rico MD  No current outpatient medications on file.    Scheduled Meds:   diphenhydrAMINE  12.5 mg Intravenous ED 1 Time    nicotine  1 patch Transdermal ED 1 Time    prochlorperazine  10 mg Intravenous ED 1 Time    sodium chloride 0.9%  1,000 mL Intravenous ED 1 Time     Continuous Infusions:  PRN Meds:.labetalol    PHYSICAL EXAM:     VITAL SIGNS: 24 HRS MIN & MAX LAST   Temp  Min: 98.1 °F (36.7 °C)  Max: 98.1 °F (36.7 °C) 98.1 °F (36.7 °C)   BP  Min: 166/79  Max: 176/91 (!) 176/91   Pulse  Min: 64  Max: 66  64   Resp  Min: 16  Max: 16 16   SpO2  Min: 95 %  Max: 96 % 95 %       Physical exam deferred to attending MD. Patient in MRI    LABS AND IMAGING:     Recent Labs   Lab 10/11/23  0901   WBC 10.68   RBC 4.45   HGB 14.8   HCT 44.1   MCV 99.1*   MCH 33.3*   MCHC 33.6   RDW 12.3      MPV 10.0       Recent Labs   Lab 10/11/23  0901      K 4.6   CO2 28   BUN 9.4*   CREATININE 0.84   CALCIUM 9.9   ALBUMIN 4.1   ALKPHOS 121   ALT 94*   AST 60*   BILITOT 0.4       Microbiology Results  (last 7 days)       Procedure Component Value Units Date/Time    Urine culture [[id number]] Collected: 10/11/23 0920    Order Status: Sent Specimen: Urine Updated: 10/11/23 1033             CT Head Without Contrast  Narrative: EXAMINATION:  CT HEAD WITHOUT CONTRAST    CLINICAL HISTORY:  Headache, chronic, new features or increased frequency;    TECHNIQUE:  Axial scans were obtained from skull base to the vertex.    Coronal and sagittal reconstructions obtained from the axial data.    Automatic exposure control was utilized to limit radiation dose.    Contrast: None    Radiation Dose:    Total DLP: 961 mGy*cm    COMPARISON:  CT head dated 01/29/2015    FINDINGS:  There is no acute intracranial hemorrhage.  There is cortical based edema in the left occipital and posterior temporal lobes, concerning for subacute ischemic changes.  Additional patchy hypodensities in the subcortical and periventricular white matter likely represent chronic microvascular ischemic changes    There is no mass effect or midline shift. The ventricles and sulci are normal in size. The basal cisterns are patent. There is no abnormal extra-axial fluid collection.  Carotid artery calcifications are noted.    The calvarium and skull base are intact.  Right mastoid effusion is noted.  Impression: Findings suggestive of subacute ischemic changes in the left PCA territory.    Electronically signed by: Denae Gamble  Date:    10/11/2023  Time:    09:39        ASSESSMENT & PLAN:     Subacute left PCA CVA   Hypertensive urgency     Plan:   Neurology consulted, follow up with recommendations  MRI brain, B Carotid US and ECHO pending  Lipid panel, hemoglobin A1c  pending  Hold antihypertensives to allow for permissive hypertension  PRN labetalol as needed for SBP greater than 220 or DBP greater than 100  PT/OT/SLP to evaluate and treat when appropriate  Neuro checks every 4 hours  Fall precautions  Resume other appropriate home medications  Labs in AM        VTE Prophylaxis: SCDs    Discharge Planning and Disposition: TBD    Lisa WISE, NP have reviewed and discussed the case with Jose Anderson MD  Please see the attending MD's addendum for further assessment and plan.    Lisa Felipe, [facility]  Department of Hospital Medicine   [facility]   10/11/2023    _______________________________________________________________________________  MD Addendum:  IDr. , assumed care of this patient today at --am/pm  For the patient encounter, I performed the substantive portion of the visit, I reviewed the NP/PA documentation, treatment plan, and medical decision making.  I had face to face time with this patient     A. History:    B. Physical exam:    C. Medical decision making:      All diagnosis and differential diagnosis have been reviewed; assessment and plan has been documented; I have personally reviewed the labs and test results that are presently available; I have reviewed the patients medication list; I have reviewed the consulting providers response and recommendations. I have reviewed or attempted to review medical records based upon their availability.    All of the patient and family questions have been addressed and answered. Patient's is agreeable to the above stated plan. I will continue to monitor closely and make adjustments to medical management as needed.      10/11/2023

## 2023-10-11 NOTE — Clinical Note
Diagnosis: CVA (cerebral vascular accident) [[id number]]   Admitting Provider:: SIXTO BERMAN [[id number]]   Future Attending Provider: SIXTO BERMAN [[id number]]   Reason for IP Medical Treatment  (Clinical interventions that can only be accomplished in the IP setting? ) :: cva   I certify that Inpatient services for greater than or equal to 2 midnights are medically necessary:: No   Plans for Post-Acute care--if anticipated (pick the single best option):: A. No post acute care anticipated at this time

## 2023-10-11 NOTE — ED PROVIDER NOTES
"Encounter Date: 10/11/2023    SCRIBE #1 NOTE: I, Marcela Bell, am scribing for, and in the presence of,  Zoila Rico MD. I have scribed the following portions of the note - Other sections scribed: HPI, ROS, PE.       History     Chief Complaint   Patient presents with    Headache     C/o headache & left earache x2 days. States numbness to R hand & decreased peripheral vision to R eye that started at 1100 yesterday. No slurred speech, weakness or facial droop noted in triage. CBG 96. States "feels similar to the spinal meningitis I had 8 years ago."      63 year old female presents to the ED with complaints of a headache and vision changes onset 2 days ago. Pt reports that the headache is on both sides of her head, and she cannot see out of her right eye. She notes that sounds and light worsen the pain. She is on Gabapentin and Norco, and she notes that neither have helped. She also complains of ear pain and denies nausea. Pt notes that she is allergic to NSAIDS.     The history is provided by the patient. No  was used.     Review of patient's allergies indicates:   Allergen Reactions    Nsaids (non-steroidal anti-inflammatory drug) Swelling     History reviewed. No pertinent past medical history.  History reviewed. No pertinent surgical history.  History reviewed. No pertinent family history.     Review of Systems   Constitutional:  Negative for chills, diaphoresis and fever.   HENT:  Positive for ear pain. Negative for congestion, sinus pain and sore throat.    Eyes:  Positive for photophobia and visual disturbance. Negative for pain and discharge.   Respiratory:  Negative for cough, shortness of breath, wheezing and stridor.    Cardiovascular:  Negative for chest pain and palpitations.   Gastrointestinal:  Negative for abdominal pain, constipation, diarrhea, nausea, rectal pain and vomiting.   Genitourinary:  Negative for dysuria and hematuria.   Musculoskeletal:  Negative for back pain " and myalgias.   Skin:  Negative for rash.   Neurological:  Positive for headaches. Negative for dizziness, syncope and numbness.   Hematological: Negative.    Psychiatric/Behavioral: Negative.     All other systems reviewed and are negative.      Physical Exam     Initial Vitals [10/11/23 0835]   BP Pulse Resp Temp SpO2   (!) 166/79 66 16 98.1 °F (36.7 °C) 96 %      MAP       --         Physical Exam    Nursing note and vitals reviewed.  Constitutional: She appears well-developed and well-nourished. She is not diaphoretic. No distress.   HENT:   Head: Normocephalic and atraumatic.   Nose: Nose normal.   Mouth/Throat: Oropharynx is clear and moist.   Erythema to ear canals, TM's clear   Eyes: Conjunctivae and EOM are normal. Pupils are equal, round, and reactive to light.   Neck: Trachea normal. Neck supple.   Moving neck   Normal range of motion.  Cardiovascular:  Normal rate, regular rhythm, normal heart sounds and intact distal pulses.           No murmur heard.  Pulmonary/Chest: Breath sounds normal. No respiratory distress. She has no wheezes. She has no rhonchi. She has no rales. She exhibits no tenderness.   Abdominal: Abdomen is soft. Bowel sounds are normal. She exhibits no distension and no mass. There is no abdominal tenderness. There is no rebound and no guarding.   Musculoskeletal:         General: No tenderness or edema. Normal range of motion.      Cervical back: Normal range of motion and neck supple.      Lumbar back: Normal. Normal range of motion.     Neurological: She is alert and oriented to person, place, and time. She has normal strength. No cranial nerve deficit or sensory deficit.   Right lateral vision loss   Skin: Skin is warm and dry. Capillary refill takes less than 2 seconds. No abscess noted. No erythema. No pallor.   Psychiatric: She has a normal mood and affect. Her behavior is normal. Judgment and thought content normal.         ED Course   Procedures  Labs Reviewed   COMPREHENSIVE  METABOLIC PANEL - Abnormal; Notable for the following components:       Result Value    Blood Urea Nitrogen 9.4 (*)     Alanine Aminotransferase 94 (*)     Aspartate Aminotransferase 60 (*)     All other components within normal limits   URINALYSIS, REFLEX TO URINE CULTURE - Abnormal; Notable for the following components:    Appearance, UA Turbid (*)     Protein, UA 1+ (*)     Blood, UA 1+ (*)     Leukocyte Esterase, UA 3+ (*)     All other components within normal limits   CBC WITH DIFFERENTIAL - Abnormal; Notable for the following components:    MCV 99.1 (*)     MCH 33.3 (*)     All other components within normal limits   URINALYSIS, MICROSCOPIC - Abnormal; Notable for the following components:    RBC, UA 8 (*)     WBC,  (*)     Bacteria, UA 4+ (*)     All other components within normal limits   CULTURE, URINE   CBC W/ AUTO DIFFERENTIAL    Narrative:     The following orders were created for panel order CBC auto differential.  Procedure                               Abnormality         Status                     ---------                               -----------         ------                     CBC with Differential[[id number]]        Abnormal            Final result                 Please view results for these tests on the individual orders.   COVID/FLU A&B PCR   LIPID PANEL   TSH   POCT GLUCOSE          Imaging Results              MRI Brain Without Contrast (In process)                      CT Head Without Contrast (Final result)  Result time 10/11/23 09:39:15      Final result by Denae Gamble MD (10/11/23 09:39:15)                   Impression:      Findings suggestive of subacute ischemic changes in the left PCA territory.      Electronically signed by: Denae Gamble  Date:    10/11/2023  Time:    09:39               Narrative:    EXAMINATION:  CT HEAD WITHOUT CONTRAST    CLINICAL HISTORY:  Headache, chronic, new features or increased frequency;    TECHNIQUE:  Axial scans were obtained  from skull base to the vertex.    Coronal and sagittal reconstructions obtained from the axial data.    Automatic exposure control was utilized to limit radiation dose.    Contrast: None    Radiation Dose:    Total DLP: 961 mGy*cm    COMPARISON:  CT head dated 01/29/2015    FINDINGS:  There is no acute intracranial hemorrhage.  There is cortical based edema in the left occipital and posterior temporal lobes, concerning for subacute ischemic changes.  Additional patchy hypodensities in the subcortical and periventricular white matter likely represent chronic microvascular ischemic changes    There is no mass effect or midline shift. The ventricles and sulci are normal in size. The basal cisterns are patent. There is no abnormal extra-axial fluid collection.  Carotid artery calcifications are noted.    The calvarium and skull base are intact.  Right mastoid effusion is noted.                                       Medications   nicotine 21 mg/24 hr 1 patch (1 patch Transdermal Patch Applied 10/11/23 1046)   labetaloL injection 10 mg (has no administration in time range)   sodium chloride 0.9% bolus 1,000 mL 1,000 mL (1,000 mLs Intravenous New Bag 10/11/23 1118)   prochlorperazine injection Soln 10 mg (10 mg Intravenous Given 10/11/23 1118)   diphenhydrAMINE injection 12.5 mg (12.5 mg Intravenous Given 10/11/23 1118)   diazePAM tablet 5 mg (5 mg Oral Given 10/11/23 1046)     Medical Decision Making  Differential diagnosis includes but is not limited to URI, CVA, intracranial hemorrhage, migraine, dehydration, uri, otitis media  Cbc, cmp, tsh, lipid panel, ua ordered and reviewed along with ekg and ct head  S/s most consistent with cva and migraine  S/s not consistent with meningitis  No fever or meningeal signs  Given iv compazine and benadryl  Givenallergy to nsaid, antiplatelet agent deferred to admitting team  Far out of window for any intervention  Admit to hospitalist for cva workup  Has mild anemia and mild  elevation in lfts     Problems Addressed:  Benign essential HTN: chronic illness or injury  Cerebrovascular accident (CVA), unspecified mechanism: acute illness or injury that poses a threat to life or bodily functions  CVA (cerebral vascular accident): acute illness or injury that poses a threat to life or bodily functions  Persistent migraine aura with cerebral infarction and without status migrainosus, not intractable: acute illness or injury that poses a threat to life or bodily functions  Tobacco abuse: chronic illness or injury with exacerbation, progression, or side effects of treatment    Amount and/or Complexity of Data Reviewed  External Data Reviewed: notes.  Labs: ordered.  Radiology: ordered.  ECG/medicine tests: ordered and independent interpretation performed.    Risk  OTC drugs.  Prescription drug management.  Decision regarding hospitalization.            Scribe Attestation:   Scribe #1: I performed the above scribed service and the documentation accurately describes the services I performed. I attest to the accuracy of the note.  Comments: Attending:   Physician Attestation Statement for Scribe #1: IZoila MD, personally performed the services described in this documentation. All medical record entries made by the scribe were at my direction and in my presence.  I have reviewed the chart and agree that the record reflects my personal performance and is accurate and complete.        Attending Attestation:           Physician Attestation for Scribe:  Physician Attestation Statement for Scribe #1: IZoila MD, reviewed documentation, as scribed by Marcela Bell in my presence, and it is both accurate and complete.             ED Course as of 10/11/23 1249   Wed Oct 11, 2023   1113 Discussed with hospitalist, defer plavix to admitting physician to determine given pts reported allergy to NSAIDs [BS]   1134 EKG performed at 11:19 a.m. rate of 51 sinus bradycardia left axis  deviation and incomplete right bundle-branch block [BS]      ED Course User Index  [BS] Zoila Rico MD               Medical Decision Making:   History:   Old Medical Records: I decided to obtain old medical records.  Old Records Summarized: records from clinic visits.       <> Summary of Records: Outpatient visits for chronic back emgan  Initial Assessment:   See hpi  Independently Interpreted Test(s):   I have ordered and independently interpreted EKG Reading(s) - see prior notes  Clinical Tests:   Lab Tests: Ordered and Reviewed  Radiological Study: Ordered and Reviewed  Medical Tests: Ordered and Reviewed  Other:   I have discussed this case with another health care provider.      Clinical Impression:   Final diagnoses:  [I63.9] CVA (cerebral vascular accident)  [I63.9] Cerebrovascular accident (CVA), unspecified mechanism (Primary)  [G43.609, I63.9] Persistent migraine aura with cerebral infarction and without status migrainosus, not intractable  [I10] Benign essential HTN  [Z72.0] Tobacco abuse        ED Disposition Condition    Admit Stable                Zoila Rico MD  10/11/23 2251

## 2023-10-11 NOTE — NURSING
Nurses Note -- 4 Eyes      10/11/2023   5:14 PM      Skin assessed during: Admit      [x] No Altered Skin Integrity Present    []Prevention Measures Documented      [] Yes- Altered Skin Integrity Present or Discovered   [] LDA Added if Not in Epic (Describe Wound)   [] New Altered Skin Integrity was Present on Admit and Documented in LDA   [] Wound Image Taken    Wound Care Consulted? No    Attending Nurse:  Rebecca Gabriel RN/Staff Member:   Ebony

## 2023-10-11 NOTE — CONSULTS
"[facility] - Emergency Dept  Neurology  Consult Note    Patient Name: Jo Ann Swan  MRN: [id number]  Admission Date: 10/11/2023  Hospital Length of Stay: 0 days  Code Status: Full Code   Attending Provider: Jose Anderson MD   Consulting Provider: MEERA Allison  Primary Care Physician: Mesfin Sousa MD  Principal Problem:<principal problem not specified>    Inpatient consult to Vascular (Stroke) Neurology  Consult performed by: Afsaneh Bates FNP  Consult ordered by: Zoila Rico MD         Subjective:     Chief Complaint:    Chief Complaint   Patient presents with    Headache     C/o headache & left earache x2 days. States numbness to R hand & decreased peripheral vision to R eye that started at 1100 yesterday. No slurred speech, weakness or facial droop noted in triage. CBG 96. States "feels similar to the spinal meningitis I had 8 years ago."           HPI:   63-year-old female with past medical history of COPD, GERD, OA, chronic back pain, presented to ED on 10/11 with reports of headache and earache x2 days followed by vision change yesterday.  She reported sudden vision loss in her right eye yesterday (10/10) morning.  She denied slurred speech, facial droop, or unilateral weakness. CTh showed subacute ischemic changes in the left PCA territory.  She was admitted to hospitalist service and Neurology was consulted for stroke workup.       History reviewed. No pertinent past medical history.    History reviewed. No pertinent surgical history.    Review of patient's allergies indicates:   Allergen Reactions    Nsaids (non-steroidal anti-inflammatory drug) Swelling       No current facility-administered medications on file prior to encounter.     No current outpatient medications on file prior to encounter.     Family History    None       Tobacco Use    Smoking status: Not on file    Smokeless tobacco: Not on file   Substance and Sexual Activity    Alcohol use: " "Not on file    Drug use: Not on file    Sexual activity: Not on file     Review of Systems   Eyes:  Positive for visual disturbance.   Neurological:  Positive for headaches. Negative for dizziness, tremors, seizures, syncope, facial asymmetry, speech difficulty, weakness, light-headedness and numbness.   All other systems reviewed and are negative.    Objective:     Vital Signs (Most Recent):  Temp: 98.1 °F (36.7 °C) (10/11/23 0835)  Pulse: 64 (10/11/23 1015)  Resp: 16 (10/11/23 0835)  BP: (!) 176/91 (10/11/23 1015)  SpO2: 95 % (10/11/23 1015) Vital Signs (24h Range):  Temp:  [98.1 °F (36.7 °C)] 98.1 °F (36.7 °C)  Pulse:  [64-66] 64  Resp:  [16] 16  SpO2:  [95 %-96 %] 95 %  BP: (166-176)/(79-91) 176/91     Weight: 72.6 kg (160 lb)  Body mass index is 25.06 kg/m².     Physical Exam  Vitals reviewed.   Constitutional:       General: She is awake. She is not in acute distress.     Appearance: She is not ill-appearing.   HENT:      Head: Normocephalic.   Eyes:      General: Visual field deficit (right visual field cut) present.      Extraocular Movements: Extraocular movements intact.      Pupils: Pupils are equal, round, and reactive to light.   Cardiovascular:      Rate and Rhythm: Normal rate and regular rhythm.   Pulmonary:      Effort: Pulmonary effort is normal.   Skin:     General: Skin is warm and dry.      Capillary Refill: Capillary refill takes less than 2 seconds.   Neurological:      Mental Status: She is alert and oriented to person, place, and time.      Cranial Nerves: No dysarthria or facial asymmetry.   NIH on admit 2: complete hemianopia.     Sensory: Sensation is intact.      Motor: Motor function is intact.      Coordination: Coordination is intact.   Psychiatric:         Attention and Perception: Attention normal.         Speech: Speech normal.         Behavior: Behavior normal. Behavior is cooperative.        Significant Labs: Hemoglobin A1c: No results for input(s): "HGBA1C" in the last 720 " "hours.  BMP:   Recent Labs   Lab 10/11/23  0901      K 4.6   CO2 28   BUN 9.4*   CREATININE 0.84   CALCIUM 9.9     CBC:   Recent Labs   Lab 10/11/23  0901   WBC 10.68   HGB 14.8   HCT 44.1        Inflammatory Markers: No results for input(s): "SEDRATE", "CRP", "PROCAL" in the last 48 hours.    Significant Imaging: I have reviewed all pertinent imaging results/findings within the past 24 hours.      Assessment and Plan:     Stroke  Stroke - left PCA  - presented with headache, right visual field cut  - Stroke RF: HTN  - Intervention: none  - Etiology: TBD    Stroke workup:  -CTH: Findings suggestive of subacute ischemic changes in the left PCA territory.  -MRI brain: Pending  -LDL:  Collected, results pending   -TSH: Collected, results pending  -not on antiplatelet, anticoagulation, or statin therapy at home      Plan:   -continue stroke workup   -ok to normalize blood pressure (now over 24 hours from symptom onset)  -Neuro checks q4hr ... stat CTh if any neuro change   -Begin ASA 81mg daily .... if failed [place], then ASA 300mg KS daily  -DVT ppx with SCD or lovenox 40 s/c daily  -Continuous telemetry monitoring  -NPO until passes Wilburn or cleared by SLP  -PT/OT/SLP to evaluate           VTE Risk Mitigation (From admission, onward)           Ordered     IP VTE LOW RISK PATIENT  Once         10/11/23 1121     Place sequential compression device  Until discontinued         10/11/23 1121                    Thank you for your consult.  Further recommendations may follow by MD. Afsaenh Bates, CELIOP  Neurology  [facility] - Emergency Dept  "

## 2023-10-12 LAB
ALBUMIN SERPL-MCNC: 3.6 G/DL (ref 3.4–4.8)
ALBUMIN/GLOB SERPL: 1.3 RATIO (ref 1.1–2)
ALP SERPL-CCNC: 102 UNIT/L (ref 40–150)
ALT SERPL-CCNC: 67 UNIT/L (ref 0–55)
APTT PPP: 26.2 SECONDS (ref 23.2–33.7)
AST SERPL-CCNC: 34 UNIT/L (ref 5–34)
BASOPHILS # BLD AUTO: 0.02 X10(3)/MCL
BASOPHILS NFR BLD AUTO: 0.2 %
BILIRUB SERPL-MCNC: 0.2 MG/DL
BUN SERPL-MCNC: 10.5 MG/DL (ref 9.8–20.1)
CALCIUM SERPL-MCNC: 9.2 MG/DL (ref 8.4–10.2)
CHLORIDE SERPL-SCNC: 106 MMOL/L (ref 98–107)
CO2 SERPL-SCNC: 25 MMOL/L (ref 23–31)
CREAT SERPL-MCNC: 0.82 MG/DL (ref 0.55–1.02)
CRP SERPL-MCNC: 8.1 MG/L
EOSINOPHIL # BLD AUTO: 0.13 X10(3)/MCL (ref 0–0.9)
EOSINOPHIL NFR BLD AUTO: 1.4 %
ERYTHROCYTE [DISTWIDTH] IN BLOOD BY AUTOMATED COUNT: 12.3 % (ref 11.5–17)
ERYTHROCYTE [SEDIMENTATION RATE] IN BLOOD: 41 MM/HR (ref 0–20)
EST. AVERAGE GLUCOSE BLD GHB EST-MCNC: 105.4 MG/DL
GFR SERPLBLD CREATININE-BSD FMLA CKD-EPI: >60 MLS/MIN/1.73/M2
GLOBULIN SER-MCNC: 2.8 GM/DL (ref 2.4–3.5)
GLUCOSE SERPL-MCNC: 107 MG/DL (ref 82–115)
HBA1C MFR BLD: 5.3 %
HCT VFR BLD AUTO: 40.3 % (ref 37–47)
HGB BLD-MCNC: 13.7 G/DL (ref 12–16)
IMM GRANULOCYTES # BLD AUTO: 0.03 X10(3)/MCL (ref 0–0.04)
IMM GRANULOCYTES NFR BLD AUTO: 0.3 %
INR PPP: 0.9
LEFT CCA DIST DIAS: 21 CM/S
LEFT CCA DIST SYS: 65 CM/S
LEFT CCA PROX DIAS: 12 CM/S
LEFT CCA PROX SYS: 80 CM/S
LEFT ECA DIAS: 15 CM/S
LEFT ECA SYS: 95 CM/S
LEFT ICA DIST DIAS: 38 CM/S
LEFT ICA DIST SYS: 116 CM/S
LEFT ICA MID DIAS: 23 CM/S
LEFT ICA MID SYS: 67 CM/S
LEFT ICA PROX DIAS: 0 CM/S
LEFT ICA PROX SYS: 28 CM/S
LEFT VERTEBRAL DIAS: 6 CM/S
LEFT VERTEBRAL SYS: 36 CM/S
LYMPHOCYTES # BLD AUTO: 3.77 X10(3)/MCL (ref 0.6–4.6)
LYMPHOCYTES NFR BLD AUTO: 41.9 %
MAGNESIUM SERPL-MCNC: 2 MG/DL (ref 1.6–2.6)
MCH RBC QN AUTO: 33.7 PG (ref 27–31)
MCHC RBC AUTO-ENTMCNC: 34 G/DL (ref 33–36)
MCV RBC AUTO: 99.3 FL (ref 80–94)
MONOCYTES # BLD AUTO: 0.79 X10(3)/MCL (ref 0.1–1.3)
MONOCYTES NFR BLD AUTO: 8.8 %
NEUTROPHILS # BLD AUTO: 4.26 X10(3)/MCL (ref 2.1–9.2)
NEUTROPHILS NFR BLD AUTO: 47.4 %
NRBC BLD AUTO-RTO: 0 %
OHS CV CAROTID RIGHT ICA EDV HIGHEST: 26
OHS CV CAROTID ULTRASOUND LEFT ICA/CCA RATIO: 1.78
OHS CV CAROTID ULTRASOUND RIGHT ICA/CCA RATIO: 1.41
OHS CV PV CAROTID LEFT HIGHEST CCA: 80
OHS CV PV CAROTID LEFT HIGHEST ICA: 116
OHS CV PV CAROTID RIGHT HIGHEST CCA: 88
OHS CV PV CAROTID RIGHT HIGHEST ICA: 104
OHS CV US CAROTID LEFT HIGHEST EDV: 38
PHOSPHATE SERPL-MCNC: 3.8 MG/DL (ref 2.3–4.7)
PLATELET # BLD AUTO: 282 X10(3)/MCL (ref 130–400)
PMV BLD AUTO: 10.2 FL (ref 7.4–10.4)
POTASSIUM SERPL-SCNC: 3.9 MMOL/L (ref 3.5–5.1)
PROT SERPL-MCNC: 6.4 GM/DL (ref 5.8–7.6)
PROTHROMBIN TIME: 12.2 SECONDS (ref 12.5–14.5)
RBC # BLD AUTO: 4.06 X10(6)/MCL (ref 4.2–5.4)
RIGHT CCA DIST DIAS: 15 CM/S
RIGHT CCA DIST SYS: 74 CM/S
RIGHT CCA PROX DIAS: 9 CM/S
RIGHT CCA PROX SYS: 88 CM/S
RIGHT ECA DIAS: 16 CM/S
RIGHT ECA SYS: 99 CM/S
RIGHT ICA DIST DIAS: 23 CM/S
RIGHT ICA DIST SYS: 61 CM/S
RIGHT ICA MID DIAS: 26 CM/S
RIGHT ICA MID SYS: 104 CM/S
RIGHT ICA PROX DIAS: 9 CM/S
RIGHT ICA PROX SYS: 32 CM/S
RIGHT VERTEBRAL DIAS: 0 CM/S
RIGHT VERTEBRAL SYS: 49 CM/S
SODIUM SERPL-SCNC: 141 MMOL/L (ref 136–145)
WBC # SPEC AUTO: 9 X10(3)/MCL (ref 4.5–11.5)

## 2023-10-12 PROCEDURE — 85652 RBC SED RATE AUTOMATED: CPT | Performed by: INTERNAL MEDICINE

## 2023-10-12 PROCEDURE — 83036 HEMOGLOBIN GLYCOSYLATED A1C: CPT | Performed by: NURSE PRACTITIONER

## 2023-10-12 PROCEDURE — 25000003 PHARM REV CODE 250: Performed by: INTERNAL MEDICINE

## 2023-10-12 PROCEDURE — 99233 PR SUBSEQUENT HOSPITAL CARE,LEVL III: ICD-10-PCS | Mod: ,,, | Performed by: SPECIALIST

## 2023-10-12 PROCEDURE — 86140 C-REACTIVE PROTEIN: CPT | Performed by: INTERNAL MEDICINE

## 2023-10-12 PROCEDURE — 11000001 HC ACUTE MED/SURG PRIVATE ROOM

## 2023-10-12 PROCEDURE — S4991 NICOTINE PATCH NONLEGEND: HCPCS | Performed by: HOSPITALIST

## 2023-10-12 PROCEDURE — 97166 OT EVAL MOD COMPLEX 45 MIN: CPT

## 2023-10-12 PROCEDURE — 84100 ASSAY OF PHOSPHORUS: CPT | Performed by: NURSE PRACTITIONER

## 2023-10-12 PROCEDURE — 80053 COMPREHEN METABOLIC PANEL: CPT | Performed by: NURSE PRACTITIONER

## 2023-10-12 PROCEDURE — 97161 PT EVAL LOW COMPLEX 20 MIN: CPT

## 2023-10-12 PROCEDURE — 99233 SBSQ HOSP IP/OBS HIGH 50: CPT | Mod: ,,, | Performed by: SPECIALIST

## 2023-10-12 PROCEDURE — 83735 ASSAY OF MAGNESIUM: CPT | Performed by: NURSE PRACTITIONER

## 2023-10-12 PROCEDURE — 85610 PROTHROMBIN TIME: CPT | Performed by: NURSE PRACTITIONER

## 2023-10-12 PROCEDURE — 25000003 PHARM REV CODE 250: Performed by: NURSE PRACTITIONER

## 2023-10-12 PROCEDURE — 85025 COMPLETE CBC W/AUTO DIFF WBC: CPT | Performed by: NURSE PRACTITIONER

## 2023-10-12 PROCEDURE — 85730 THROMBOPLASTIN TIME PARTIAL: CPT | Performed by: INTERNAL MEDICINE

## 2023-10-12 PROCEDURE — 63600175 PHARM REV CODE 636 W HCPCS: Performed by: INTERNAL MEDICINE

## 2023-10-12 PROCEDURE — 25000003 PHARM REV CODE 250: Performed by: HOSPITALIST

## 2023-10-12 RX ORDER — CLOPIDOGREL BISULFATE 75 MG/1
75 TABLET ORAL DAILY
Status: DISCONTINUED | OUTPATIENT
Start: 2023-10-12 | End: 2023-10-13 | Stop reason: HOSPADM

## 2023-10-12 RX ORDER — IBUPROFEN 200 MG
1 TABLET ORAL DAILY
Status: DISCONTINUED | OUTPATIENT
Start: 2023-10-12 | End: 2023-10-13 | Stop reason: HOSPADM

## 2023-10-12 RX ORDER — BUTALBITAL, ACETAMINOPHEN AND CAFFEINE 50; 325; 40 MG/1; MG/1; MG/1
2 TABLET ORAL EVERY 4 HOURS PRN
Status: DISCONTINUED | OUTPATIENT
Start: 2023-10-12 | End: 2023-10-13 | Stop reason: HOSPADM

## 2023-10-12 RX ADMIN — GABAPENTIN 600 MG: 300 CAPSULE ORAL at 02:10

## 2023-10-12 RX ADMIN — BUTALBITAL, ACETAMINOPHEN, AND CAFFEINE 2 TABLET: 50; 325; 40 TABLET ORAL at 06:10

## 2023-10-12 RX ADMIN — DIAZEPAM 10 MG: 5 TABLET ORAL at 09:10

## 2023-10-12 RX ADMIN — BUTALBITAL, ACETAMINOPHEN, AND CAFFEINE 2 TABLET: 50; 325; 40 TABLET ORAL at 04:10

## 2023-10-12 RX ADMIN — ASPIRIN 81 MG: 81 TABLET, COATED ORAL at 09:10

## 2023-10-12 RX ADMIN — CLOPIDOGREL BISULFATE 75 MG: 75 TABLET ORAL at 01:10

## 2023-10-12 RX ADMIN — CIPROFLOXACIN 250 MG: 250 TABLET, COATED ORAL at 09:10

## 2023-10-12 RX ADMIN — GABAPENTIN 600 MG: 300 CAPSULE ORAL at 09:10

## 2023-10-12 RX ADMIN — BUTALBITAL, ACETAMINOPHEN, AND CAFFEINE 2 TABLET: 50; 325; 40 TABLET ORAL at 09:10

## 2023-10-12 RX ADMIN — ATORVASTATIN CALCIUM 40 MG: 40 TABLET, FILM COATED ORAL at 09:10

## 2023-10-12 RX ADMIN — AMLODIPINE BESYLATE 10 MG: 5 TABLET ORAL at 09:10

## 2023-10-12 RX ADMIN — HYDROCODONE BITARTRATE AND ACETAMINOPHEN 1 TABLET: 7.5; 325 TABLET ORAL at 01:10

## 2023-10-12 RX ADMIN — ENOXAPARIN SODIUM 40 MG: 40 INJECTION SUBCUTANEOUS at 06:10

## 2023-10-12 RX ADMIN — NICOTINE 1 PATCH: 21 PATCH, EXTENDED RELEASE TRANSDERMAL at 09:10

## 2023-10-12 RX ADMIN — ACETAMINOPHEN 1000 MG: 500 TABLET, FILM COATED ORAL at 07:10

## 2023-10-12 NOTE — PT/OT/SLP EVAL
Physical Therapy Evaluation and Discharge Note    Patient Name:  Jo Ann Swan   MRN:  [id number]    Recommendations:     Discharge Recommendations: home with home health  Discharge Equipment Recommendations: none   Barriers to discharge: Ongoing medical needs    Assessment:     Jo Ann Swan is a 63 y.o. female admitted with a medical diagnosis of Acute left PCA territory ischemic infarct, Left PCA occlusion, Hypertension, and Acute UTI. At this time, patient is functioning at their prior level of function and does not require further acute PT services. Pt found with HOB elevated and was accompanied by her sister. Pt stated that she has trouble with seeing on her right side. Pt reported of previous falls in her past d/t her legs giving out on her. Pt was able to perform semi supine to sit SBA, sit <> stand SBA, and amb about 250ft SBA with no AD demo steady step through gait pattern, normal torie, and no overt LOB. Pt with slight veering to the L with amb trial 2/2 visual impairments. At this time pt does not demonstrate any functional or mobility impairments.      Recent Surgery: * No surgery found *      Plan:     During this hospitalization, patient does not require further acute PT services.  Please re-consult if situation changes.      Subjective     Chief Complaint: numbness in R arm   Patient/Family Comments/goals: PLOF  Pain/Comfort:   Pain in left ear/left side of her head    Patients cultural, spiritual, Muslim conflicts given the current situation: no    Living Environment:  Pt lives with her boyfriend. Ramp and rails upon entry.   Prior to admission, patients level of function was Independent.  Equipment used at home: none.  DME owned (not currently used):  cane and rolling walker .    Upon discharge, patient will have assistance from family.    Objective:     Communicated with NSG prior to session.  Patient found HOB elevated with peripheral IV, SCD upon PT entry to room.    General  Precautions: Standard, vision impaired, fall   Respiratory Status: Room air  Blood Pressure: 145/78    Exams:  Cognitive Exam:  Patient is oriented to Person, Place, and Time  BLE Strength: WNL  BLE ROM: WNL    Functional Mobility:  Bed Mobility:     Semi Supine to Sit: stand by assistance  Transfers:     Sit <> Stand:  stand by assistance with no AD  Gait: Pt amb about 250ft SBA with no AD demo steady step through gait pattern, normal torie, slight veering to the L 2/2 to visual impairments, and no overt LOB noted.    Treatment and Education:    Patient and family were provided with verbal education education regarding mobility and safety.  Understanding was verbalized.     Patient left sitting edge of bed with  OT present.    GOALS:   Multidisciplinary Problems       Physical Therapy Goals       Not on file                    History:     History reviewed. No pertinent past medical history.    History reviewed. No pertinent surgical history.    Time Tracking:     PT Received On: 10/12/23  PT Start Time: 0952     PT Stop Time: 1005  PT Total Time (min): 13 min     Billable Minutes: Evaluation low      10/12/2023

## 2023-10-12 NOTE — PLAN OF CARE
Problem: Occupational Therapy  Goal: Occupational Therapy Goal  Description: Goals to be met by: 11/12/2023    Patient will increase functional independence with ADLs by performing:    Pt will show competence with compensatory vision strategies such as visual scanning, with no cues, in order to participate in reading and writing activity to increase.  Pt will use visual scanning in order to navigate environment requiring no cues to decrease probably of falls in home.    Outcome: Ongoing, Progressing

## 2023-10-12 NOTE — PROGRESS NOTES
[facility] Medicine Progress Note        Chief Complaint: Inpatient Follow-up     HPI:   63-year-old female medical history of hypertension, chronic back pain with radiculopathy/opiate dependent, tobacco smoker present to the ED with complaint of right hand numbness, right visual field deficit and left-sided headache all started around 11:00 a.m. on 10/10/2023.  CT head show finding suggestive of subacute ischemic infarct in the left PCA territory and this was confirmed by MRI showing acute left PCA territory infarct without hemorrhage, CTA head and neck showed occluded proximal left PCA.  Echo show LVEF 55-60%, grade 1 diastolic dysfunction, negative bubble study, no significant valvular abnormalities. Her labs notable for urinalysis consistent with UTI with significantly elevated WBCs.  Evaluated by Neurology, initiated on aspirin       Interval Hx:  Patient reports continued headache and blurred vision this morning.  No extremity weakness.  Has not yet worked with PT/OT.  at bedside.  Patient asking about her home chronic pain/anxiety regimen.     Objective/physical exam:  General: In no acute distress, afebrile  Chest: Clear to auscultation bilaterally  Heart: RRR, +S1, S2, no appreciable murmur  Abdomen: Soft, nontender, BS +  MSK: Warm, no lower extremity edema, no clubbing or cyanosis  Neurologic: Alert and oriented x4, Cranial nerve II-XII intact, Strength 5/5 in all 4 extremities    VITAL SIGNS: 24 HRS MIN & MAX LAST   Temp  Min: 97.5 °F (36.4 °C)  Max: 99.1 °F (37.3 °C) 97.5 °F (36.4 °C)   BP  Min: 119/69  Max: 176/91 (!) 153/89   Pulse  Min: 57  Max: 70  64   Resp  Min: 15  Max: 19 18   SpO2  Min: 91 %  Max: 97 % 95 %       Recent Labs   Lab 10/11/23  0901 10/12/23  0536   WBC 10.68 9.00   RBC 4.45 4.06*   HGB 14.8 13.7   HCT 44.1 40.3   MCV 99.1* 99.3*   MCH 33.3* 33.7*   MCHC 33.6 34.0   RDW 12.3 12.3    282   MPV 10.0 10.2       Recent Labs   Lab  10/11/23  0901 10/12/23  0536    141   K 4.6 3.9   CO2 28 25   BUN 9.4* 10.5   CREATININE 0.84 0.82   CALCIUM 9.9 9.2   MG  --  2.00   ALBUMIN 4.1 3.6   ALKPHOS 121 102   ALT 94* 67*   AST 60* 34   BILITOT 0.4 0.2          Microbiology Results (last 7 days)       Procedure Component Value Units Date/Time    Urine culture [[id number]]  (Abnormal) Collected: 10/11/23 0920    Order Status: Completed Specimen: Urine Updated: 10/12/23 0701     Urine Culture >/= 100,000 colonies/ml Gram-negative Rods             See below for Radiology    Scheduled Med:   amLODIPine  10 mg Oral Daily    aspirin  81 mg Oral Daily    atorvastatin  40 mg Oral QHS    ciprofloxacin HCl  250 mg Oral Q12H    enoxparin  40 mg Subcutaneous Q24H (prophylaxis, 1700)    gabapentin  600 mg Oral TID    nicotine  1 patch Transdermal ED 1 Time        Continuous Infusions:       PRN Meds:  acetaminophen, butalbital-acetaminophen-caffeine -40 mg, diazePAM, hydrALAZINE, HYDROcodone-acetaminophen, labetalol, ondansetron       Assessment/Plan:   Acute left PCA territory ischemic infarct  Left PCA occlusion  Hypertension  Acute UTI  , HDL 37    Follow up neuro recs regarding new CVA.   Speech has cleared for oral intake. PT/OT eval today.   Continue ASA and statin.   Patient empirically on Cipro for Uti. No leukocytosis or fever but pyuria and bacteruria on UA.  Follow up cultures.  Will plan on short run of antibiotics.   No longer requires permissive HTN.  Back on home meds  Patient asking for home Norco and Valium.  Would like neuro to have a chance to see her first before re-initiating any sedating  meds.         All diagnosis and differential diagnosis have been reviewed; assessment and plan has been documented; I have personally reviewed the labs and test results that are presently available; I have reviewed the patients medication list; I have reviewed the consulting providers response and recommendations. I have reviewed or attempted  to review medical records based upon their availability    All of the patient's questions have been  addressed and answered. Patient's is agreeable to the above stated plan. I will continue to monitor closely and make adjustments to medical management as needed.  _____________________________________________________________________      Radiology:  Echo    Left Ventricle: The left ventricle is normal in size. Normal wall   thickness. Normal wall motion. There is normal systolic function with a   visually estimated ejection fraction of 55 - 60%. Grade I diastolic   dysfunction.    Right Ventricle: Normal right ventricular cavity size. Systolic   function is normal.    Aortic Valve: There is no stenosis. Aortic valve peak velocity is 1.48   m/s. Mean gradient is 5 mmHg. There is no significant regurgitation.    Mitral Valve: There is no stenosis. The mean pressure gradient across   the mitral valve is 2 mmHg at a heart rate of  bpm. There is trace   regurgitation.    Tricuspid Valve: There is no stenosis. There is trace regurgitation.    Pulmonic Valve: There is no stenosis.    IVC/SVC: Normal venous pressure at 3 mmHg.    Bubble study appears negative.  CTA Head and Neck (xpd)  Narrative: EXAMINATION:  CTA HEAD AND NECK (XPD)    CLINICAL HISTORY:  Stroke/TIA, determine embolic source;    TECHNIQUE:  Axial images obtained through the cervical region and [place] before and after the administration of intravenous contrast.    Coronal, sagittal, MIP and 3D reconstructions were obtained from the axial data.    Automatic exposure control was utilized to limit radiation dose.    Radiation Dose:    Total DLP: 2525 mGy*cm    COMPARISON:  CT head and MRI brain dated 10/11/2023    FINDINGS:  Head CT with contrast:    No interval changes when compared to the previous CT.    No enhancing abnormalities.    If present, stenosis of the carotid bulbs is measured based on NASCET criteria,    i.e. area of maximal stenosis  compared to the cervical ICA distal to the bulb.    Cervical CTA:    The origins of the great vessels are patent mild scattered calcifications.    The common carotid arteries are patent.  There are mild calcifications at the carotid bulbs without hemodynamically significant stenosis.  The internal carotid arteries are patent.    The vertebral arteries are patent    Intracranial CTA:    There are calcifications along the course of the carotid siphons without hemodynamically significant stenosis.  The middle cerebral arteries and anterior cerebral arteries are patent.    The vertebral arteries, basilar artery and right posterior cerebral artery are patent.  The posterior cerebral artery is occluded proximally.    The dural venous sinuses are patent.  Impression: Occluded proximal left posterior cerebral artery.    Electronically signed by: Denae Gamble  Date:    10/11/2023  Time:    13:51  MRI Brain Without Contrast  Narrative: EXAMINATION:  MRI BRAIN WITHOUT CONTRAST    CLINICAL HISTORY:  Stroke, follow up;    TECHNIQUE:  Multiplanar, multisequence MR images of the brain were obtained without the administration of intravenous contrast.    COMPARISON:  CT head dated 10/11/2023    FINDINGS:  There is restricted diffusion predominantly in the left medial occipital lobe and posterior temporal lobe, with small amount in the medial left parietal lobe.  There is no evidence of hemorrhagic transformation.  Mild additional scattered T2/FLAIR hyperintensities in the subcortical and periventricular white matter likely represent chronic microvascular ischemic changes.    There is no mass effect or midline shift.  The basal cisterns are patent.  There is mild diffuse parenchymal volume loss.  There is no hydrocephalus or abnormal extra-axial fluid collection.  The major intracranial flow voids are patent.  There is a right mastoid effusion  Impression: 1. Acute left PCA territory infarct without hemorrhage.  2. Mild chronic  microvascular ischemic changes.    Electronically signed by: Denae Gamble  Date:    10/11/2023  Time:    12:54  CT Head Without Contrast  Narrative: EXAMINATION:  CT HEAD WITHOUT CONTRAST    CLINICAL HISTORY:  Headache, chronic, new features or increased frequency;    TECHNIQUE:  Axial scans were obtained from skull base to the vertex.    Coronal and sagittal reconstructions obtained from the axial data.    Automatic exposure control was utilized to limit radiation dose.    Contrast: None    Radiation Dose:    Total DLP: 961 mGy*cm    COMPARISON:  CT head dated 01/29/2015    FINDINGS:  There is no acute intracranial hemorrhage.  There is cortical based edema in the left occipital and posterior temporal lobes, concerning for subacute ischemic changes.  Additional patchy hypodensities in the subcortical and periventricular white matter likely represent chronic microvascular ischemic changes    There is no mass effect or midline shift. The ventricles and sulci are normal in size. The basal cisterns are patent. There is no abnormal extra-axial fluid collection.  Carotid artery calcifications are noted.    The calvarium and skull base are intact.  Right mastoid effusion is noted.  Impression: Findings suggestive of subacute ischemic changes in the left PCA territory.    Electronically signed by: Denae Gamble  Date:    10/11/2023  Time:    09:39      Abraham Thomas MD   10/12/2023

## 2023-10-12 NOTE — ASSESSMENT & PLAN NOTE
Stroke - left PCA  - presented with headache, right visual field cut.  Initial NIH 2.  - Stroke RF: HTN, HLD  - Intervention: none  - Etiology: TBD    Stroke workup:  -CTH: Findings suggestive of subacute ischemic changes in the left PCA territory.  -CTA h/n:  Occluded proximal left posterior cerebral artery.  -MRI brain:    1. Acute left PCA territory infarct without hemorrhage.  2. Mild chronic microvascular ischemic changes.  -ECHO: EF 55-60%, bubble study negative, normal LA  -CUS: bilateral ICA less than 50% stenosis  -LDL:  123  -A1c: 5.3   -TSH: 1.843  -not on antiplatelet, anticoagulation, or statin therapy at home      Plan:   -continue ASA 81mg daily  -continue Atorvastatin 40mg daily   -Neuro checks q4hr ... stat CTh if any neuro change  -PT/OT/SLP to evaluate   -will discuss with MD if home [place] and Valium can be resumed      Further recommendations to follow by MD.

## 2023-10-12 NOTE — PROGRESS NOTES
[facility] - Neurology  Neurology  Progress Note    Patient Name: Jo Ann Swan  MRN: [id number]  Admission Date: 10/11/2023  Hospital Length of Stay: 1 days  Code Status: Full Code   Attending Provider: Abraham Thomas MD  Primary Care Physician: Mesfin Sousa MD   Principal Problem:Stroke    HPI:   63-year-old female with past medical history of COPD, GERD, OA, chronic back pain, presented to ED on 10/11 with reports of headache and earache x2 days followed by vision change yesterday.  She reported sudden vision loss in her right eye yesterday (10/10) morning.  She denied slurred speech, facial droop, or unilateral weakness. CTh showed subacute ischemic changes in the left PCA territory.  She was admitted to hospitalist service and Neurology was consulted for stroke workup.        Subjective:     Interval History: No new issues overnight.  Continues to complain of left sided headache.    Current Facility-Administered Medications   Medication Dose Route Frequency Provider Last Rate Last Admin    acetaminophen tablet 1,000 mg  1,000 mg Oral Q6H PRN Sapna Mo MD   1,000 mg at 10/12/23 0720    amLODIPine tablet 10 mg  10 mg Oral Daily Sapna Mo MD        aspirin EC tablet 81 mg  81 mg Oral Daily Afsaneh Bates, FNP   81 mg at 10/11/23 1752    atorvastatin tablet 40 mg  40 mg Oral QHS Sapna Mo MD   40 mg at 10/11/23 2342    butalbital-acetaminophen-caffeine -40 mg per tablet 2 tablet  2 tablet Oral Q4H PRN Sapna Mo MD   2 tablet at 10/12/23 0424    ciprofloxacin HCl tablet 250 mg  250 mg Oral Q12H Sapna Mo MD   250 mg at 10/11/23 2342    diazePAM tablet 10 mg  10 mg Oral Daily PRN Sapna Mo MD        enoxaparin injection 40 mg  40 mg Subcutaneous Q24H (prophylaxis, 1700) Sapna Mo MD   40 mg at 10/11/23 2304    gabapentin capsule 600 mg  600 mg Oral TID Sapna Mo MD   600 mg at 10/11/23 2303    hydrALAZINE injection 10 mg  10 mg Intravenous Q4H PRN  Sapna Mo MD        HYDROcodone-acetaminophen 7.5-325 mg per tablet 1 tablet  1 tablet Oral BID PRN Sapna Mo MD   1 tablet at 10/11/23 2303    labetaloL injection 10 mg  10 mg Intravenous Q4H PRN Sapna Mo MD        nicotine 21 mg/24 hr 1 patch  1 patch Transdermal ED 1 Time Zoila Rico MD   1 patch at 10/11/23 1046    nicotine 21 mg/24 hr 1 patch  1 patch Transdermal Daily Abraham Thomas MD        ondansetron injection 4 mg  4 mg Intravenous Q4H PRN Sapna Mo MD           Review of Systems   Eyes:  Positive for visual disturbance.   Neurological:  Positive for headaches. Negative for dizziness, tremors, seizures, syncope, facial asymmetry, speech difficulty, weakness, light-headedness and numbness.   All other systems reviewed and are negative.    Objective:     Vital Signs (Most Recent):  Temp: 100 °F (37.8 °C) (10/12/23 0700)  Pulse: 66 (10/12/23 0700)  Resp: 18 (10/12/23 0700)  BP: 123/81 (10/12/23 0700)  SpO2: (!) 94 % (10/12/23 0700) Vital Signs (24h Range):  Temp:  [97.5 °F (36.4 °C)-100 °F (37.8 °C)] 100 °F (37.8 °C)  Pulse:  [57-70] 66  Resp:  [15-19] 18  SpO2:  [91 %-97 %] 94 %  BP: (119-176)/(69-91) 123/81     Weight: 72.6 kg (160 lb)  Body mass index is 25.06 kg/m².     Physical Exam  Vitals reviewed.   Constitutional:       General: She is awake. She is not in acute distress.     Appearance: She is not ill-appearing.   HENT:      Head: Normocephalic.   Eyes:      General: Visual field deficit (right visual field cut) present.      Extraocular Movements: Extraocular movements intact.      Pupils: Pupils are equal, round, and reactive to light.   Cardiovascular:      Rate and Rhythm: Normal rate and regular rhythm.   Pulmonary:      Effort: Pulmonary effort is normal.   Skin:     General: Skin is warm and dry.      Capillary Refill: Capillary refill takes less than 2 seconds.   Neurological:      Mental Status: She is alert and oriented to person, place, and time.      Cranial  Nerves: No dysarthria or facial asymmetry.      Sensory: Sensation is intact.      Motor: Motor function is intact.      Coordination: Coordination is intact.   Psychiatric:         Attention and Perception: Attention normal.         Speech: Speech normal.         Behavior: Behavior normal. Behavior is cooperative.        Significant Labs: Hemoglobin A1c:   Recent Labs   Lab 10/12/23  0536   HGBA1C 5.3     BMP:   Recent Labs   Lab 10/11/23  0901 10/12/23  0536    141   K 4.6 3.9   CO2 28 25   BUN 9.4* 10.5   CREATININE 0.84 0.82   CALCIUM 9.9 9.2   MG  --  2.00     CBC:   Recent Labs   Lab 10/11/23  0901 10/12/23  0536   WBC 10.68 9.00   HGB 14.8 13.7   HCT 44.1 40.3    282     Inflammatory Markers:   Recent Labs   Lab 10/12/23  0536   SEDRATE 41*   CRP 8.10*       Significant Imaging: I have reviewed all pertinent imaging results/findings within the past 24 hours.      Assessment and Plan:     * Stroke  Stroke - left PCA  - presented with headache, right visual field cut.  Initial NIH 2.  - Stroke RF: HTN, HLD  - Intervention: none  - Etiology: TBD    Stroke workup:  -CTH: Findings suggestive of subacute ischemic changes in the left PCA territory.  -CTA h/n:  Occluded proximal left posterior cerebral artery.  -MRI brain:    1. Acute left PCA territory infarct without hemorrhage.  2. Mild chronic microvascular ischemic changes.  -ECHO: EF 55-60%, bubble study negative, normal LA  -CUS: bilateral ICA less than 50% stenosis  -LDL:  123  -A1c: 5.3   -TSH: 1.843  -not on antiplatelet, anticoagulation, or statin therapy at home      Plan:   -continue ASA 81mg daily  -continue Atorvastatin 40mg daily   -Neuro checks q4hr ... stat CTh if any neuro change  -PT/OT/SLP to evaluate   -will discuss with MD if home [place] and Valium can be resumed      Further recommendations to follow by MD.      1245:  Rounded with Dr Valera  Will change ASA to Plavix due to NSAID allergy  Continue Atorvastatin 40mg daily  Ok to  discharge from neurology standpoint      VTE Risk Mitigation (From admission, onward)           Ordered     enoxaparin injection 40 mg  Every 24 hours         10/11/23 2236     IP VTE LOW RISK PATIENT  Once         10/11/23 1121     Place sequential compression device  Until discontinued         10/11/23 1121                    Afsaneh Bates, MEERA  Neurology  [facility] - Neurology

## 2023-10-12 NOTE — PLAN OF CARE
Gricel phone to get facility name for Home Health due to patient has Medicaid insurance. Home Health Referral sent to [facility].

## 2023-10-12 NOTE — PT/OT/SLP EVAL
Occupational Therapy  Evaluation    Name: Jo Ann Swan  MRN: [id number]  Recent Surgery: * No surgery found *      Recommendations:     Discharge Recommendations: home with home health  Discharge Equipment Recommendations:   (TBD)  Barriers to discharge:  Inaccessible home environment    Assessment:     Jo Ann Swan is a 63 y.o. female with c/o headache and earache , numbness in R hand and decreased peripheral vision in R eye. Pt diagnosed with left PCA ischemic stroke and  L PCA occlusion, pts sister reports that  pt is a chain smoker. Overall pt required SPV to SBA without AD for ambulation possibly 2/2 to visual field cut and navigating new environment.  She presents with the following performance deficits affecting function: visual deficits, impaired self care skills. Prior to admission pt was indpeendent in all ADLs. Upon discharge, this patient would benefit from home with home health.    Rehab Prognosis: Good; patient would benefit from acute skilled OT services to address these deficits and reach maximum level of function.       Plan:     Patient to be seen 4 x/week to address the above listed problems via self-care/home management, therapeutic activities, therapeutic exercises  Plan of Care Expires: 11/12/23  Plan of Care Reviewed with: patient, sibling    Subjective     Chief Complaint: I am scared, my mom had one stroke then kept having them and each time got worse.  Patient/Family Comments/goals: PLOF    Occupational Profile:  Living Environment: Pt lives in [facility] with her BF and a ramp with rails on both sides. She has a tub shower without a shower chair.  Previous level of function: Pt was IND in all ADLs but has not been driving since she had surgery on her back in 1990, because her sciatica will make her R leg go numb randomly.  Roles and Routines: sister, gf  Equipment Used at Home:  (pt reports she has left over DME from her mother but did not specify what DME)  Assistance upon Discharge:  "Sister and boyfriend    Pain/Comfort:  Pain Rating 1: 0/10    Patients cultural, spiritual, Synagogue conflicts given the current situation: no    Objective:     OT communicated with PT Sue and Denita prior to session to obtain home environment info.      Patient was found sitting edge of bed with peripheral IV upon OT entry to room.    General Precautions: Standard, vision impaired, fall  Orthopedic Precautions: N/A  Braces: N/A    Vital Signs: Blood Pressure: 145/78  Respiratory Status: on room air    Bed Mobility:    Patient completed Scooting/Bridging with modified independence    Functional Mobility/Transfers:  Patient completed Sit <> Stand Transfer with stand by assistance  with  no assistive device   Patient completed Bed <> Chair Transfer using Step Transfer technique with supervision with no assistive device  Patient completed Toilet Transfer Step Transfer technique with stand by assistance with  no AD  Functional Mobility: Pt able to ambulate without walker requiring SPV to SBA possibly 2/2 to visual field cut on right eye, pt does not report any weakness in R/LLE and no LOB noted.     Activities of Daily Living:  Lower Body Dressing: independence pt donned underwear with IND sitting to lace feet through leg holes and standing to pull them up to her hips. Pt also don/doff socks with IND  Toileting: supervision pt requiring SPV to navigate environment to bathroom but complete sit<>stand from toilet with IND and use of grab bar.      Functional Cognition:  Intact    Visual Perceptual Skills:  Pursuits: WFL except slight lag in vision when crossing ML from top to bottom.  Visual Fields: Impaired. Pt with R visual field cut When performing peripheral vision test pt did not see OTs finger until it was at her ML on the right side, intact and WNL on left side.  Pt asked to read "Treat yourself, [facility]" on menu pt struggled and required inc time to read the menu and reports that in not " baseline for her, OT to further assess    Upper Extremity Function:  Right Upper Extremity:   Range of Motion: WFL, pt reports she has broken her right wrist 4x but ROM normal.  Strength: pt reporting some weakness with MMT 4/5  Coordination: WFL except increased time for opposition test and nose to finger test.  Sensation: WFL except pt reports tingling, but correctly indentified where OT was touching her hand over all three peripheral nerves.    Left Upper Extremity:  Range of Motion: WFL  Strength: MMT 4/5  Coordination: WFL    Balance:   Intact    Therapeutic Positioning  Risk for acquired pressure injuries is decreased due to ability to mobilize independently .    OT interventions performed during the course of today's session:   Education was provided on benefits of and recommendations for therapeutic positioning    Skin assessment: all bony prominences were assessed    Findings: no redness or breakdown noted    OT recommendations for therapeutic positioning throughout hospitalization:   Follow [facility] Pressure Injury Prevention Protocol      Patient Education:  Patient and sister provided with verbal education education regarding OT role/goals/POC, fall prevention, and safety awareness.  Understanding was verbalized.     Patient left HOB elevated with all lines intact, call button in reach, and sister present    GOALS:   Multidisciplinary Problems       Occupational Therapy Goals          Problem: Occupational Therapy    Goal Priority Disciplines Outcome Interventions   Occupational Therapy Goal     OT, PT/OT Ongoing, Progressing    Description: Goals to be met by: 11/12/2023    Patient will increase functional independence with ADLs by performing:    Pt will show competence with compensatory vision strategies such as visual scanning, with no cues, in order to participate in reading and writing activity to increase.  Pt will use visual scanning in order to navigate environment requiring no cues to decrease  probably of falls in home.                         History:     History reviewed. No pertinent past medical history.    History reviewed. No pertinent surgical history.    Time Tracking:     OT Date of Treatment:    OT Start Time: 1004  OT Stop Time: 1028  OT Total Time (min): 24 min    Billable Minutes:Evaluation MOD    10/12/2023

## 2023-10-12 NOTE — PLAN OF CARE
10/12/23 1658   Discharge Assessment   Assessment Type Discharge Planning Assessment   Confirmed/corrected address, phone number and insurance Yes   Confirmed Demographics Correct on Facesheet   Source of Information patient   When was your last doctors appointment?   (PCP is Dr Mesfin Sousa)   Communicated MIGUE with patient/caregiver Date not available/Unable to determine   Reason For Admission Cerebrovascular Accident   People in Home significant other  (Cameron Arora is SO)   Do you expect to return to your current living situation? Yes   Do you have help at home or someone to help you manage your care at home?   (S.O. -- Cameron Arora)   Prior to hospitilization cognitive status: Alert/Oriented   Current cognitive status: Alert/Oriented   Walking or Climbing Stairs   (Ambulates independently)   Dressing/Bathing   (Performs ADLS)   Do you have any problems with:   (Significant Other : Cameron Smith)   Home Layout Able to live on 1st floor   Equipment Currently Used at Home cane, straight;oxygen  (Pulse Ox.)   Patient currently being followed by outpatient case management? No   Do you currently have service(s) that help you manage your care at home? No   Do you take prescription medications? Yes   Do you have prescription coverage? Yes   Coverage Medicaid - [employer] [facility]   Do you have any problems affording any of your prescribed medications? No   Is the patient taking medications as prescribed? yes   Who is going to help you get home at discharge? S.O. will bring home --Cameron Arora   How do you get to doctors appointments? family or friend will provide;health plan transportation   Are you on dialysis? No   Do you take coumadin? No   DME Needed Upon Discharge  none   Discharge Plan discussed with: Patient   Discharge Plan A Home Health;Home with family;Home  (Home Health OT)   Discharge Plan B Home with family;Home  (Home Health --OT)   Physical Activity   On average, how many days per week  do you engage in moderate to strenuous exercise (like a brisk walk)? 0 days   On average, how many minutes do you engage in exercise at this level? 0 min   Financial Resource Strain   How hard is it for you to pay for the very basics like food, housing, medical care, and heating? Not hard   Housing Stability   In the last 12 months, was there a time when you were not able to pay the mortgage or rent on time? N   In the last 12 months, how many places have you lived? 1   In the last 12 months, was there a time when you did not have a steady place to sleep or slept in a shelter (including now)? N   Transportation Needs   In the past 12 months, has lack of transportation kept you from medical appointments or from getting medications? no   In the past 12 months, has lack of transportation kept you from meetings, work, or from getting things needed for daily living? No   Food Insecurity   Within the past 12 months, you worried that your food would run out before you got the money to buy more. Never true   Within the past 12 months, the food you bought just didn't last and you didn't have money to get more. Never true   Stress   Do you feel stress - tense, restless, nervous, or anxious, or unable to sleep at night because your mind is troubled all the time - these days? Only a littl   Social Connections   In a typical week, how many times do you talk on the phone with family, friends, or neighbors? More than 3   How often do you get together with friends or relatives? More than 3   How often do you attend Adventism or Spiritism services? Never   Do you belong to any clubs or organizations such as Adventism groups, unions, fraternal or athletic groups, or school groups? No   How often do you attend meetings of the clubs or organizations you belong to? Never   Are you , , , , never , or living with a partner?   (Same SO for 41 years Cameron Arora)   Alcohol Use   Q1: How often do you have a  drink containing alcohol? Never   Q2: How many drinks containing alcohol do you have on a typical day when you are drinking? None   Q3: How often do you have six or more drinks on one occasion? Never   OTHER   Name(s) of People in Home Cameron Arora     Patient would like to find a new PCP but does not wan a nurse practitioner. She desires an MD. CM phone [facility] PCP number but no Medical doctor available for PCP appointment. Informed patient of this but she only wants a medical doctor . She informed  she will keep her present PCP until she finds a MD who takes her insurance. She will call customer service number on the back of her Health insurance card.

## 2023-10-12 NOTE — SUBJECTIVE & OBJECTIVE
Subjective:     Interval History: No new issues overnight.  Continues to complain of left sided headache.    Current Facility-Administered Medications   Medication Dose Route Frequency Provider Last Rate Last Admin    acetaminophen tablet 1,000 mg  1,000 mg Oral Q6H PRN Sapna Mo MD   1,000 mg at 10/12/23 0720    amLODIPine tablet 10 mg  10 mg Oral Daily Sapna Mo MD        aspirin EC tablet 81 mg  81 mg Oral Daily Afsaneh Bates FNP   81 mg at 10/11/23 1752    atorvastatin tablet 40 mg  40 mg Oral QHS Sapna Mo MD   40 mg at 10/11/23 2342    butalbital-acetaminophen-caffeine -40 mg per tablet 2 tablet  2 tablet Oral Q4H PRN Sapna Mo MD   2 tablet at 10/12/23 0424    ciprofloxacin HCl tablet 250 mg  250 mg Oral Q12H Sapna Mo MD   250 mg at 10/11/23 2342    diazePAM tablet 10 mg  10 mg Oral Daily PRN Sapna Mo MD        enoxaparin injection 40 mg  40 mg Subcutaneous Q24H (prophylaxis, 1700) Sapna Mo MD   40 mg at 10/11/23 2304    gabapentin capsule 600 mg  600 mg Oral TID Sapna Mo MD   600 mg at 10/11/23 2303    hydrALAZINE injection 10 mg  10 mg Intravenous Q4H PRN Sapna Mo MD        HYDROcodone-acetaminophen 7.5-325 mg per tablet 1 tablet  1 tablet Oral BID PRN Sapna Mo MD   1 tablet at 10/11/23 2303    labetaloL injection 10 mg  10 mg Intravenous Q4H PRN Sapna Mo MD        nicotine 21 mg/24 hr 1 patch  1 patch Transdermal ED 1 Time Zoila Rico MD   1 patch at 10/11/23 1046    nicotine 21 mg/24 hr 1 patch  1 patch Transdermal Daily Abraham Thomas MD        ondansetron injection 4 mg  4 mg Intravenous Q4H PRN Sapna Mo MD           Review of Systems   Eyes:  Positive for visual disturbance.   Neurological:  Positive for headaches. Negative for dizziness, tremors, seizures, syncope, facial asymmetry, speech difficulty, weakness, light-headedness and numbness.   All other systems reviewed and are negative.    Objective:     Vital Signs (Most  Recent):  Temp: 100 °F (37.8 °C) (10/12/23 0700)  Pulse: 66 (10/12/23 0700)  Resp: 18 (10/12/23 0700)  BP: 123/81 (10/12/23 0700)  SpO2: (!) 94 % (10/12/23 0700) Vital Signs (24h Range):  Temp:  [97.5 °F (36.4 °C)-100 °F (37.8 °C)] 100 °F (37.8 °C)  Pulse:  [57-70] 66  Resp:  [15-19] 18  SpO2:  [91 %-97 %] 94 %  BP: (119-176)/(69-91) 123/81     Weight: 72.6 kg (160 lb)  Body mass index is 25.06 kg/m².     Physical Exam  Vitals reviewed.   Constitutional:       General: She is awake. She is not in acute distress.     Appearance: She is not ill-appearing.   HENT:      Head: Normocephalic.   Eyes:      General: Visual field deficit (right visual field cut) present.      Extraocular Movements: Extraocular movements intact.      Pupils: Pupils are equal, round, and reactive to light.   Cardiovascular:      Rate and Rhythm: Normal rate and regular rhythm.   Pulmonary:      Effort: Pulmonary effort is normal.   Skin:     General: Skin is warm and dry.      Capillary Refill: Capillary refill takes less than 2 seconds.   Neurological:      Mental Status: She is alert and oriented to person, place, and time.      Cranial Nerves: No dysarthria or facial asymmetry.      Sensory: Sensation is intact.      Motor: Motor function is intact.      Coordination: Coordination is intact.   Psychiatric:         Attention and Perception: Attention normal.         Speech: Speech normal.         Behavior: Behavior normal. Behavior is cooperative.        Significant Labs: Hemoglobin A1c:   Recent Labs   Lab 10/12/23  0536   HGBA1C 5.3     BMP:   Recent Labs   Lab 10/11/23  0901 10/12/23  0536    141   K 4.6 3.9   CO2 28 25   BUN 9.4* 10.5   CREATININE 0.84 0.82   CALCIUM 9.9 9.2   MG  --  2.00     CBC:   Recent Labs   Lab 10/11/23  0901 10/12/23  0536   WBC 10.68 9.00   HGB 14.8 13.7   HCT 44.1 40.3    282     Inflammatory Markers:   Recent Labs   Lab 10/12/23  0536   SEDRATE 41*   CRP 8.10*       Significant Imaging: I have  reviewed all pertinent imaging results/findings within the past 24 hours.

## 2023-10-13 VITALS
SYSTOLIC BLOOD PRESSURE: 142 MMHG | TEMPERATURE: 98 F | OXYGEN SATURATION: 96 % | BODY MASS INDEX: 25.11 KG/M2 | HEART RATE: 77 BPM | WEIGHT: 160 LBS | DIASTOLIC BLOOD PRESSURE: 90 MMHG | HEIGHT: 67 IN | RESPIRATION RATE: 18 BRPM

## 2023-10-13 LAB
ALBUMIN SERPL-MCNC: 3.3 G/DL (ref 3.4–4.8)
ALBUMIN/GLOB SERPL: 1.3 RATIO (ref 1.1–2)
ALP SERPL-CCNC: 92 UNIT/L (ref 40–150)
ALT SERPL-CCNC: 43 UNIT/L (ref 0–55)
AST SERPL-CCNC: 20 UNIT/L (ref 5–34)
BACTERIA UR CULT: ABNORMAL
BASOPHILS # BLD AUTO: 0.03 X10(3)/MCL
BASOPHILS NFR BLD AUTO: 0.3 %
BILIRUB SERPL-MCNC: 0.2 MG/DL
BUN SERPL-MCNC: 19.3 MG/DL (ref 9.8–20.1)
CALCIUM SERPL-MCNC: 8.9 MG/DL (ref 8.4–10.2)
CHLORIDE SERPL-SCNC: 108 MMOL/L (ref 98–107)
CO2 SERPL-SCNC: 25 MMOL/L (ref 23–31)
CREAT SERPL-MCNC: 0.99 MG/DL (ref 0.55–1.02)
EOSINOPHIL # BLD AUTO: 0.17 X10(3)/MCL (ref 0–0.9)
EOSINOPHIL NFR BLD AUTO: 1.6 %
ERYTHROCYTE [DISTWIDTH] IN BLOOD BY AUTOMATED COUNT: 12.1 % (ref 11.5–17)
GFR SERPLBLD CREATININE-BSD FMLA CKD-EPI: >60 MLS/MIN/1.73/M2
GLOBULIN SER-MCNC: 2.6 GM/DL (ref 2.4–3.5)
GLUCOSE SERPL-MCNC: 104 MG/DL (ref 82–115)
HCT VFR BLD AUTO: 38.9 % (ref 37–47)
HGB BLD-MCNC: 13.1 G/DL (ref 12–16)
IMM GRANULOCYTES # BLD AUTO: 0.04 X10(3)/MCL (ref 0–0.04)
IMM GRANULOCYTES NFR BLD AUTO: 0.4 %
LYMPHOCYTES # BLD AUTO: 3.54 X10(3)/MCL (ref 0.6–4.6)
LYMPHOCYTES NFR BLD AUTO: 33.5 %
MCH RBC QN AUTO: 33.3 PG (ref 27–31)
MCHC RBC AUTO-ENTMCNC: 33.7 G/DL (ref 33–36)
MCV RBC AUTO: 99 FL (ref 80–94)
MONOCYTES # BLD AUTO: 0.99 X10(3)/MCL (ref 0.1–1.3)
MONOCYTES NFR BLD AUTO: 9.4 %
NEUTROPHILS # BLD AUTO: 5.8 X10(3)/MCL (ref 2.1–9.2)
NEUTROPHILS NFR BLD AUTO: 54.8 %
NRBC BLD AUTO-RTO: 0 %
PLATELET # BLD AUTO: 264 X10(3)/MCL (ref 130–400)
PMV BLD AUTO: 10.2 FL (ref 7.4–10.4)
POTASSIUM SERPL-SCNC: 3.7 MMOL/L (ref 3.5–5.1)
PROT SERPL-MCNC: 5.9 GM/DL (ref 5.8–7.6)
RBC # BLD AUTO: 3.93 X10(6)/MCL (ref 4.2–5.4)
SODIUM SERPL-SCNC: 141 MMOL/L (ref 136–145)
WBC # SPEC AUTO: 10.57 X10(3)/MCL (ref 4.5–11.5)

## 2023-10-13 PROCEDURE — S4991 NICOTINE PATCH NONLEGEND: HCPCS | Performed by: HOSPITALIST

## 2023-10-13 PROCEDURE — 25000003 PHARM REV CODE 250: Performed by: INTERNAL MEDICINE

## 2023-10-13 PROCEDURE — 25000003 PHARM REV CODE 250: Performed by: HOSPITALIST

## 2023-10-13 PROCEDURE — 85025 COMPLETE CBC W/AUTO DIFF WBC: CPT | Performed by: NURSE PRACTITIONER

## 2023-10-13 PROCEDURE — 80053 COMPREHEN METABOLIC PANEL: CPT | Performed by: NURSE PRACTITIONER

## 2023-10-13 PROCEDURE — 25000003 PHARM REV CODE 250: Performed by: NURSE PRACTITIONER

## 2023-10-13 RX ORDER — CLOPIDOGREL BISULFATE 75 MG/1
75 TABLET ORAL DAILY
Qty: 30 TABLET | Refills: 11 | Status: SHIPPED | OUTPATIENT
Start: 2023-10-13 | End: 2023-12-11 | Stop reason: SDUPTHER

## 2023-10-13 RX ORDER — IBUPROFEN 200 MG
1 TABLET ORAL DAILY
Qty: 30 PATCH | Refills: 1 | Status: SHIPPED | OUTPATIENT
Start: 2023-10-13 | End: 2024-01-08

## 2023-10-13 RX ORDER — ATORVASTATIN CALCIUM 40 MG/1
40 TABLET, FILM COATED ORAL NIGHTLY
Qty: 90 TABLET | Refills: 3 | Status: SHIPPED | OUTPATIENT
Start: 2023-10-13 | End: 2024-01-18 | Stop reason: SDUPTHER

## 2023-10-13 RX ADMIN — CLOPIDOGREL BISULFATE 75 MG: 75 TABLET ORAL at 09:10

## 2023-10-13 RX ADMIN — CIPROFLOXACIN 250 MG: 250 TABLET, COATED ORAL at 09:10

## 2023-10-13 RX ADMIN — AMLODIPINE BESYLATE 10 MG: 5 TABLET ORAL at 09:10

## 2023-10-13 RX ADMIN — GABAPENTIN 600 MG: 300 CAPSULE ORAL at 09:10

## 2023-10-13 RX ADMIN — NICOTINE 1 PATCH: 21 PATCH, EXTENDED RELEASE TRANSDERMAL at 09:10

## 2023-10-13 RX ADMIN — HYDROCODONE BITARTRATE AND ACETAMINOPHEN 1 TABLET: 7.5; 325 TABLET ORAL at 07:10

## 2023-10-13 NOTE — PLAN OF CARE
Problem: Adult Inpatient Plan of Care  Goal: Plan of Care Review  Outcome: Ongoing, Progressing  Goal: Patient-Specific Goal (Individualized)  Outcome: Ongoing, Progressing  Goal: Absence of Hospital-Acquired Illness or Injury  Outcome: Ongoing, Progressing  Goal: Optimal Comfort and Wellbeing  Outcome: Ongoing, Progressing  Goal: Readiness for Transition of Care  Outcome: Ongoing, Progressing     Problem: Infection  Goal: Absence of Infection Signs and Symptoms  Outcome: Ongoing, Progressing     Problem: Adjustment to Illness (Stroke, Ischemic/Transient Ischemic Attack)  Goal: Optimal Coping  Outcome: Ongoing, Progressing     Problem: Bowel Elimination Impaired (Stroke, Ischemic/Transient Ischemic Attack)  Goal: Effective Bowel Elimination  Outcome: Ongoing, Progressing     Problem: Cerebral Tissue Perfusion (Stroke, Ischemic/Transient Ischemic Attack)  Goal: Optimal Cerebral Tissue Perfusion  Outcome: Ongoing, Progressing     Problem: Cognitive Impairment (Stroke, Ischemic/Transient Ischemic Attack)  Goal: Optimal Cognitive Function  Outcome: Ongoing, Progressing     Problem: Communication Impairment (Stroke, Ischemic/Transient Ischemic Attack)  Goal: Improved Communication Skills  Outcome: Ongoing, Progressing     Problem: Functional Ability Impaired (Stroke, Ischemic/Transient Ischemic Attack)  Goal: Optimal Functional Ability  Outcome: Ongoing, Progressing     Problem: Sensorimotor Impairment (Stroke, Ischemic/Transient Ischemic Attack)  Goal: Improved Sensorimotor Function  Outcome: Ongoing, Progressing     Problem: Swallowing Impairment (Stroke, Ischemic/Transient Ischemic Attack)  Goal: Optimal Eating and Swallowing without Aspiration  Outcome: Ongoing, Progressing     Problem: Urinary Elimination Impaired (Stroke, Ischemic/Transient Ischemic Attack)  Goal: Effective Urinary Elimination  Outcome: Ongoing, Progressing     Problem: Fall Injury Risk  Goal: Absence of Fall and Fall-Related Injury  Outcome:  Ongoing, Progressing

## 2023-10-13 NOTE — PLAN OF CARE
Problem: Adult Inpatient Plan of Care  Goal: Plan of Care Review  Outcome: Met  Goal: Patient-Specific Goal (Individualized)  Outcome: Met  Goal: Absence of Hospital-Acquired Illness or Injury  Outcome: Met  Goal: Optimal Comfort and Wellbeing  Outcome: Met  Goal: Readiness for Transition of Care  Outcome: Met     Problem: Infection  Goal: Absence of Infection Signs and Symptoms  Outcome: Met     Problem: Adjustment to Illness (Stroke, Ischemic/Transient Ischemic Attack)  Goal: Optimal Coping  Outcome: Met     Problem: Bowel Elimination Impaired (Stroke, Ischemic/Transient Ischemic Attack)  Goal: Effective Bowel Elimination  Outcome: Met     Problem: Cerebral Tissue Perfusion (Stroke, Ischemic/Transient Ischemic Attack)  Goal: Optimal Cerebral Tissue Perfusion  Outcome: Met     Problem: Cognitive Impairment (Stroke, Ischemic/Transient Ischemic Attack)  Goal: Optimal Cognitive Function  Outcome: Met     Problem: Communication Impairment (Stroke, Ischemic/Transient Ischemic Attack)  Goal: Improved Communication Skills  Outcome: Met     Problem: Functional Ability Impaired (Stroke, Ischemic/Transient Ischemic Attack)  Goal: Optimal Functional Ability  Outcome: Met     Problem: Respiratory Compromise (Stroke, Ischemic/Transient Ischemic Attack)  Goal: Effective Oxygenation and Ventilation  Outcome: Met     Problem: Sensorimotor Impairment (Stroke, Ischemic/Transient Ischemic Attack)  Goal: Improved Sensorimotor Function  Outcome: Met     Problem: Swallowing Impairment (Stroke, Ischemic/Transient Ischemic Attack)  Goal: Optimal Eating and Swallowing without Aspiration  Outcome: Met     Problem: Urinary Elimination Impaired (Stroke, Ischemic/Transient Ischemic Attack)  Goal: Effective Urinary Elimination  Outcome: Met     Problem: Fall Injury Risk  Goal: Absence of Fall and Fall-Related Injury  Outcome: Met      no indicators present

## 2023-10-13 NOTE — NURSING
"Patient insisted on going downstairs to smoke says she "can't fight the urge anymore" and that her "nicotine patch is wearing off". Patient and  educated on use of a nicotine patch, smoking cessation, and risk of smoking. Patient and  verbalized understanding.  accompanied patient downstairs to go smoke.     "

## 2023-10-13 NOTE — PLAN OF CARE
[facility] sent avs, mar & dc summary/orders to [facility] via [facility]

## 2023-10-13 NOTE — DISCHARGE SUMMARY
[facility] Hospital Medicine Discharge Summary    Admit Date: 10/11/2023  Discharge Date and Time: 10/13/98057:22 AM  Admitting Physician: Hospitalist team   Discharging Physician: Abraham Thomas MD.  Primary Care Physician: Mesfin Sousa MD      Discharge Diagnoses:  Acute left PCA territory ischemic infarct  Left PCA occlusion  Hypertension  Acute UTI  Hyperlipidemia  Blurred vision  Tobacco abuse    Hospital Course:   63-year-old female medical history of hypertension, chronic back pain with radiculopathy/opiate dependent, tobacco smoker present to the ED with complaint of right hand numbness, right visual field deficit and left-sided headache all started around 11:00 a.m. on 10/10/2023.  CT head show finding suggestive of subacute ischemic infarct in the left PCA territory and this was confirmed by MRI showing acute left PCA territory infarct without hemorrhage, CTA head and neck showed occluded proximal left PCA.  Echo show LVEF 55-60%, grade 1 diastolic dysfunction, negative bubble study, no significant valvular abnormalities. Her labs notable for urinalysis consistent with UTI with significantly elevated WBCs.  Evaluated by Neurology, initiated on aspirin and Plavix.  She was still complaining of blurred vision.  Recommend follow-up with Ophthalmology as an outpatient.  She was set up to follow up in the stroke Clinic as well.  She worked with physical therapy and she would benefit from some further therapy services as an outpatient.  Case management has set up for home health.  We also had a extended conversation regarding smoking cessation.  She states that she was ready to quit and requested a nicotine patch.  Prescriptions sent to her pharmacy.  Also referral made to smoking cessation clinic.  She completed 3 days of oral ciprofloxacin on the hospital.  Do not feel that she needs any further antibiotics for possible UTI.  Asymptomatic.      12 minutes spent on counseling  "on tobacco cessation.  Counseled on harm and risks associated with smoking including stroke, heart disease, lung disease.  Discussed cessation resources and treatment including nicotine patch.      Vitals:  Blood pressure 106/69, pulse 65, temperature 97.7 °F (36.5 °C), temperature source Oral, resp. rate 18, height 5' 7" (1.702 m), weight 72.6 kg (160 lb), SpO2 (!) 94 %..    Physical Exam:  Awake, Alert, Oriented x 3, No new focal Neurologic deficit, Normal Affect  NC/AT, PERRLA, EOMI  Supple neck, no JVD, No cervical lymphadenopathy  Symmetrical chest, Good air entry bilaterally. No rhonchi, wheezes, crackles appreciated  RRR, No gallop, rub or murmur  +ve Bowel sounds x4, Abd soft and non tender, no rebound, guarding or rigidity  No Cyanosis, cludding or edema, No new rash or bruises    Procedures Performed: No admission procedures for hospital encounter.     Significant Diagnostic Studies: See Full reports for all details  No results displayed because visit has over 200 results.           Microbiology Results (last 7 days)       Procedure Component Value Units Date/Time    Urine culture [[id number]]  (Abnormal) Collected: 10/11/23 0920    Order Status: Completed Specimen: Urine Updated: 10/12/23 0701     Urine Culture >/= 100,000 colonies/ml Gram-negative Rods             CV Ultrasound Bilateral Doppler Carotid    Result Date: 10/12/2023  The right internal carotid artery demonstrated less than 50% stenosis. The left internal carotid artery demonstrated less than 50% stenosis. The bilateral vertebral arteries were patent with antegrade flow.    Echo    Result Date: 10/11/2023    Left Ventricle: The left ventricle is normal in size. Normal wall thickness. Normal wall motion. There is normal systolic function with a visually estimated ejection fraction of 55 - 60%. Grade I diastolic dysfunction.   Right Ventricle: Normal right ventricular cavity size. Systolic function is normal.   Aortic Valve: There is no " stenosis. Aortic valve peak velocity is 1.48 m/s. Mean gradient is 5 mmHg. There is no significant regurgitation.   Mitral Valve: There is no stenosis. The mean pressure gradient across the mitral valve is 2 mmHg at a heart rate of  bpm. There is trace regurgitation.   Tricuspid Valve: There is no stenosis. There is trace regurgitation.   Pulmonic Valve: There is no stenosis.   IVC/SVC: Normal venous pressure at 3 mmHg.   Bubble study appears negative.     CTA Head and Neck (xpd)    Result Date: 10/11/2023  EXAMINATION: CTA HEAD AND NECK (XPD) CLINICAL HISTORY: Stroke/TIA, determine embolic source; TECHNIQUE: Axial images obtained through the cervical region and [place] before and after the administration of intravenous contrast. Coronal, sagittal, MIP and 3D reconstructions were obtained from the axial data. Automatic exposure control was utilized to limit radiation dose. Radiation Dose: Total DLP: 2525 mGy*cm COMPARISON: CT head and MRI brain dated 10/11/2023 FINDINGS: Head CT with contrast: No interval changes when compared to the previous CT. No enhancing abnormalities. If present, stenosis of the carotid bulbs is measured based on NASCET criteria, i.e. area of maximal stenosis compared to the cervical ICA distal to the bulb. Cervical CTA: The origins of the great vessels are patent mild scattered calcifications. The common carotid arteries are patent.  There are mild calcifications at the carotid bulbs without hemodynamically significant stenosis.  The internal carotid arteries are patent. The vertebral arteries are patent Intracranial CTA: There are calcifications along the course of the carotid siphons without hemodynamically significant stenosis.  The middle cerebral arteries and anterior cerebral arteries are patent. The vertebral arteries, basilar artery and right posterior cerebral artery are patent.  The posterior cerebral artery is occluded proximally. The dural venous sinuses are patent.      Occluded proximal left posterior cerebral artery. Electronically signed by: Denae Gamble Date:    10/11/2023 Time:    13:51    MRI Brain Without Contrast    Result Date: 10/11/2023  EXAMINATION: MRI BRAIN WITHOUT CONTRAST CLINICAL HISTORY: Stroke, follow up; TECHNIQUE: Multiplanar, multisequence MR images of the brain were obtained without the administration of intravenous contrast. COMPARISON: CT head dated 10/11/2023 FINDINGS: There is restricted diffusion predominantly in the left medial occipital lobe and posterior temporal lobe, with small amount in the medial left parietal lobe.  There is no evidence of hemorrhagic transformation.  Mild additional scattered T2/FLAIR hyperintensities in the subcortical and periventricular white matter likely represent chronic microvascular ischemic changes. There is no mass effect or midline shift.  The basal cisterns are patent.  There is mild diffuse parenchymal volume loss.  There is no hydrocephalus or abnormal extra-axial fluid collection.  The major intracranial flow voids are patent.  There is a right mastoid effusion     1. Acute left PCA territory infarct without hemorrhage. 2. Mild chronic microvascular ischemic changes. Electronically signed by: Denae Gamble Date:    10/11/2023 Time:    12:54    CT Head Without Contrast    Result Date: 10/11/2023  EXAMINATION: CT HEAD WITHOUT CONTRAST CLINICAL HISTORY: Headache, chronic, new features or increased frequency; TECHNIQUE: Axial scans were obtained from skull base to the vertex. Coronal and sagittal reconstructions obtained from the axial data. Automatic exposure control was utilized to limit radiation dose. Contrast: None Radiation Dose: Total DLP: 961 mGy*cm COMPARISON: CT head dated 01/29/2015 FINDINGS: There is no acute intracranial hemorrhage.  There is cortical based edema in the left occipital and posterior temporal lobes, concerning for subacute ischemic changes.  Additional patchy hypodensities in the  subcortical and periventricular white matter likely represent chronic microvascular ischemic changes There is no mass effect or midline shift. The ventricles and sulci are normal in size. The basal cisterns are patent. There is no abnormal extra-axial fluid collection.  Carotid artery calcifications are noted. The calvarium and skull base are intact.  Right mastoid effusion is noted.     Findings suggestive of subacute ischemic changes in the left PCA territory. Electronically signed by: Denae Gamble Date:    10/11/2023 Time:    09:39  - pulls last radiology orders        Medication List        START taking these medications      atorvastatin 40 MG tablet  Commonly known as: LIPITOR  Take 1 tablet (40 mg total) by mouth every evening.     clopidogreL 75 mg tablet  Commonly known as: PLAVIX  Take 1 tablet (75 mg total) by mouth once daily.     nicotine 21 mg/24 hr  Commonly known as: NICODERM CQ  Place 1 patch onto the skin once daily.            CONTINUE taking these medications      amLODIPine 10 MG tablet  Commonly known as: NORVASC     diazePAM 10 MG Tab  Commonly known as: VALIUM     gabapentin 600 MG tablet  Commonly known as: NEURONTIN     HYDROcodone-acetaminophen 7.5-325 mg per tablet  Commonly known as: NORCO               Where to Get Your Medications        These medications were sent to [facility] [address]  [address] [facility] [address]      Phone: [phone]   atorvastatin 40 MG tablet  clopidogreL 75 mg tablet  nicotine 21 mg/24 hr          Explained in detail to the patient about the discharge plan, medications, and follow-up visits. Pt understands and agrees with the treatment plan  Discharged Condition: stable  Diet: cardiac  Disposition: home    Medications Per [place] med rec  Activities as tolerated  Follow up with your PCP in 2 wks   For further questions contact hospitalist office    Discharge time 33 minutes    For worsening  symptoms, chest pain, shortness of breath, increased abdominal pain, high grade fever, stroke or stroke like symptoms, immediately go to the nearest Emergency Room or call 911 as soon as possible.      Abraham Mcmillan M.D, on 10/13/2023. at 7:22 AM.

## 2023-10-18 ENCOUNTER — PATIENT OUTREACH (OUTPATIENT)
Dept: ADMINISTRATIVE | Facility: CLINIC | Age: 63
End: 2023-10-18
Payer: MEDICAID

## 2023-10-18 NOTE — PROGRESS NOTES
C3 nurse spoke with Jo Ann Swan for a TCC post hospital discharge follow up call. The patient does not have a scheduled [facility] appointment with Mesfin Sousa MD within 5-7 days post hospital discharge date 10/13/23. The patient stated she will have to find a new doctor since her PCP does not take medicaid.  Number provided for the [facility] appointment scheduling and patient advised to call.  Patient also has f/u appointments with Clara Serrano MD (Ophthalmology); OCTOBER 20TH @ 10:00AM and Stew Barroso MD (Neurology) on 11/29/2023; at 12:30pm.

## 2023-10-20 ENCOUNTER — HOSPITAL ENCOUNTER (EMERGENCY)
Facility: HOSPITAL | Age: 63
Discharge: HOME | End: 2023-10-20
Attending: EMERGENCY MEDICINE
Payer: MEDICAID

## 2023-10-20 VITALS
DIASTOLIC BLOOD PRESSURE: 89 MMHG | RESPIRATION RATE: 19 BRPM | HEART RATE: 88 BPM | TEMPERATURE: 98 F | SYSTOLIC BLOOD PRESSURE: 156 MMHG | OXYGEN SATURATION: 98 %

## 2023-10-20 DIAGNOSIS — S99.921A RIGHT FOOT INJURY, INITIAL ENCOUNTER: ICD-10-CM

## 2023-10-20 DIAGNOSIS — L03.818 CELLULITIS OF OTHER SPECIFIED SITE: Primary | ICD-10-CM

## 2023-10-20 DIAGNOSIS — I63.9 CEREBROVASCULAR ACCIDENT (CVA), UNSPECIFIED MECHANISM: ICD-10-CM

## 2023-10-20 PROCEDURE — 25000003 PHARM REV CODE 250

## 2023-10-20 PROCEDURE — 99284 EMERGENCY DEPT VISIT MOD MDM: CPT

## 2023-10-20 RX ORDER — HYDROCODONE BITARTRATE AND ACETAMINOPHEN 7.5; 325 MG/1; MG/1
1 TABLET ORAL EVERY 8 HOURS PRN
Qty: 20 TABLET | Refills: 0 | Status: SHIPPED | OUTPATIENT
Start: 2023-10-20 | End: 2023-10-27

## 2023-10-20 RX ORDER — SULFAMETHOXAZOLE AND TRIMETHOPRIM 800; 160 MG/1; MG/1
1 TABLET ORAL 2 TIMES DAILY
Qty: 14 TABLET | Refills: 0 | Status: SHIPPED | OUTPATIENT
Start: 2023-10-20 | End: 2023-10-27

## 2023-10-20 RX ORDER — HYDROCODONE BITARTRATE AND ACETAMINOPHEN 5; 325 MG/1; MG/1
1 TABLET ORAL
Status: COMPLETED | OUTPATIENT
Start: 2023-10-20 | End: 2023-10-20

## 2023-10-20 RX ORDER — GABAPENTIN 600 MG/1
600 TABLET ORAL 3 TIMES DAILY
Qty: 30 TABLET | Refills: 0 | Status: SHIPPED | OUTPATIENT
Start: 2023-10-20 | End: 2023-10-30

## 2023-10-20 RX ORDER — MUPIROCIN 20 MG/G
OINTMENT TOPICAL 2 TIMES DAILY
Qty: 1 G | Refills: 0 | Status: SHIPPED | OUTPATIENT
Start: 2023-10-20 | End: 2023-10-27

## 2023-10-20 RX ORDER — AMLODIPINE BESYLATE 10 MG/1
10 TABLET ORAL DAILY
Qty: 30 TABLET | Refills: 0 | Status: SHIPPED | OUTPATIENT
Start: 2023-10-20 | End: 2023-12-11 | Stop reason: SDUPTHER

## 2023-10-20 RX ADMIN — HYDROCODONE BITARTRATE AND ACETAMINOPHEN 1 TABLET: 5; 325 TABLET ORAL at 02:10
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
## 2023-10-20 NOTE — DISCHARGE INSTRUCTIONS
Take Bactrim twice a day as prescribed for infection control. Apply Bactroban to toe twice a day.  Keep toe clean and dry.  Okay to wash with soap and water, pat dry following.      Take medication as prescribed.      Call [phone] get set up with primary care physician. Ophthalmology referral sent to [facility] from ER, call [phone] to check on referral

## 2023-10-20 NOTE — FIRST PROVIDER EVALUATION
Medical screening examination initiated.  I have conducted a focused provider triage encounter, findings are as follows:    Brief history of present illness:  63-year-old female with history of CVA presents to ED for evaluation right 5th toe pain and swelling.  Patient states had recent CVA causing vision deficits.  Reports getting out of bed and hitting her toe.    Vitals:    10/20/23 1241   BP: (!) 156/89   BP Location: Left arm   Patient Position: Sitting   Pulse: 88   Resp: 18   Temp: 98.3 °F (36.8 °C)   TempSrc: Oral   SpO2: 98%       Pertinent physical exam:  Patient is awake and alert and oriented.  Ambulatory to triage.  In no acute distress.      Brief workup plan:  XR    Preliminary workup initiated; this workup will be continued and followed by the physician or advanced practice provider that is assigned to the patient when roomed.

## 2023-11-14 ENCOUNTER — OFFICE VISIT (OUTPATIENT)
Dept: OPHTHALMOLOGY | Facility: CLINIC | Age: 63
End: 2023-11-14
Payer: MEDICAID

## 2023-11-14 VITALS — HEIGHT: 67 IN | WEIGHT: 160.06 LBS | BODY MASS INDEX: 25.12 KG/M2

## 2023-11-14 DIAGNOSIS — H25.13 AGE-RELATED NUCLEAR CATARACT OF BOTH EYES: ICD-10-CM

## 2023-11-14 DIAGNOSIS — I10 HYPERTENSION, UNSPECIFIED TYPE: ICD-10-CM

## 2023-11-14 DIAGNOSIS — H35.033 HYPERTENSIVE RETINOPATHY OF BOTH EYES, GRADE 1: ICD-10-CM

## 2023-11-14 DIAGNOSIS — H53.461 HEMIANOPIA, HOMONYMOUS, RIGHT: ICD-10-CM

## 2023-11-14 DIAGNOSIS — H04.129 DRY EYE: ICD-10-CM

## 2023-11-14 DIAGNOSIS — I63.9 CEREBROVASCULAR ACCIDENT (CVA), UNSPECIFIED MECHANISM: Primary | ICD-10-CM

## 2023-11-14 PROCEDURE — 92083 EXTENDED VISUAL FIELD XM: CPT | Mod: PBBFAC,PN | Performed by: OPHTHALMOLOGY

## 2023-11-14 PROCEDURE — 99213 OFFICE O/P EST LOW 20 MIN: CPT | Mod: PBBFAC,PN

## 2023-11-14 PROCEDURE — 92083 EXTENDED VISUAL FIELD XM: CPT | Mod: PBBFAC,PN

## 2023-11-14 PROCEDURE — 92134 CPTRZ OPH DX IMG PST SGM RTA: CPT | Mod: PBBFAC,PN | Performed by: OPHTHALMOLOGY

## 2023-11-14 PROCEDURE — 92134 CPTRZ OPH DX IMG PST SGM RTA: CPT | Mod: PBBFAC,PN

## 2023-11-14 RX ORDER — ALBUTEROL 2 MG/1
2 TABLET ORAL
COMMUNITY

## 2023-11-14 RX ORDER — FLUTICASONE PROPIONATE 50 MCG
1 SPRAY, SUSPENSION (ML) NASAL
COMMUNITY

## 2023-11-14 RX ORDER — PHENYLEPH/TROPICAMIDE IN WATER 2.5 %-1 %
1 DROPS OPHTHALMIC (EYE) ONCE
Status: COMPLETED | OUTPATIENT
Start: 2023-11-14 | End: 2023-11-14

## 2023-11-14 RX ORDER — SUMATRIPTAN SUCCINATE 100 MG/1
100 TABLET ORAL
COMMUNITY

## 2023-11-14 RX ADMIN — Medication 1 DROP: at 11:11

## 2023-11-14 NOTE — PROGRESS NOTES
HPI     Dry Eye     Additional comments: Patient states that she has a long history of dry   eyes. Used to use AFT but was nervous to continue due to the   contamination.            Comments    Cerebrovascular accident          Last edited by Elizabeth Silva MA on 11/14/2023 11:29 AM.            Assessment /Plan     For exam results, see Encounter Report.    OCT Mac 11/14/23  , fc preserved, no fluid  , fc preserved, no fluid    OCT RNFL 11/14/23  , white N, rest green  , white nasal, rest green    HVF 24-2  OD 0/11 FL, 0% FP/FN, right hemifield defect  OS 2/15 FL, 0% FP/FN, right hemifield defect    Assessment/Plan    #Stroke Left PCA  - Episode occurred 10/10/23  - Numbness tingling right hand, right sided visual defecits   - right hemidefect HVF  - BCVA 20/20 // 20/25, mrx today 11/14/23  - Emphasized good bp control, has appt with neurology and pcp this month  - CTM    #Hypertension w/out retinopathy  - no retinopathy seen on exam today  - emphasized good bp control, follow up with pcp  - RTC 1 yr annual dfe    #NSC  #Hyperopia w/astigmatism and presbyopia  - NVS, mrx today 11/14/23. Note does not want bifocals  - CTM yearly dfe    #Dry Eyes OU  - AT, WC  - CTM    RTC 6 months Vision check

## 2023-11-15 PROBLEM — H53.461 HEMIANOPIA, HOMONYMOUS, RIGHT: Status: ACTIVE | Noted: 2023-11-15

## 2023-11-20 ENCOUNTER — TELEPHONE (OUTPATIENT)
Dept: NEUROLOGY | Facility: CLINIC | Age: 63
End: 2023-11-20
Payer: MEDICAID

## 2023-11-21 ENCOUNTER — TELEPHONE (OUTPATIENT)
Dept: NEUROLOGY | Facility: CLINIC | Age: 63
End: 2023-11-21
Payer: MEDICAID

## 2023-12-11 ENCOUNTER — OFFICE VISIT (OUTPATIENT)
Dept: NEUROLOGY | Facility: CLINIC | Age: 63
End: 2023-12-11
Payer: MEDICAID

## 2023-12-11 VITALS
WEIGHT: 160 LBS | HEIGHT: 67 IN | HEART RATE: 74 BPM | SYSTOLIC BLOOD PRESSURE: 131 MMHG | DIASTOLIC BLOOD PRESSURE: 81 MMHG | BODY MASS INDEX: 25.11 KG/M2

## 2023-12-11 DIAGNOSIS — I63.9 CEREBROVASCULAR ACCIDENT (CVA), UNSPECIFIED MECHANISM: Primary | ICD-10-CM

## 2023-12-11 DIAGNOSIS — R51.9 ACUTE INTRACTABLE HEADACHE, UNSPECIFIED HEADACHE TYPE: ICD-10-CM

## 2023-12-11 DIAGNOSIS — M54.12 CERVICAL RADICULOPATHY: ICD-10-CM

## 2023-12-11 PROCEDURE — 3044F PR MOST RECENT HEMOGLOBIN A1C LEVEL <7.0%: ICD-10-PCS | Mod: CPTII,,, | Performed by: PSYCHIATRY & NEUROLOGY

## 2023-12-11 PROCEDURE — 3079F DIAST BP 80-89 MM HG: CPT | Mod: CPTII,,, | Performed by: PSYCHIATRY & NEUROLOGY

## 2023-12-11 PROCEDURE — 99999 PR PBB SHADOW E&M-EST. PATIENT-LVL III: CPT | Mod: PBBFAC,,, | Performed by: PSYCHIATRY & NEUROLOGY

## 2023-12-11 PROCEDURE — 99213 OFFICE O/P EST LOW 20 MIN: CPT | Mod: PBBFAC | Performed by: PSYCHIATRY & NEUROLOGY

## 2023-12-11 PROCEDURE — 1159F MED LIST DOCD IN RCRD: CPT | Mod: CPTII,,, | Performed by: PSYCHIATRY & NEUROLOGY

## 2023-12-11 PROCEDURE — 1159F PR MEDICATION LIST DOCUMENTED IN MEDICAL RECORD: ICD-10-PCS | Mod: CPTII,,, | Performed by: PSYCHIATRY & NEUROLOGY

## 2023-12-11 PROCEDURE — 3079F PR MOST RECENT DIASTOLIC BLOOD PRESSURE 80-89 MM HG: ICD-10-PCS | Mod: CPTII,,, | Performed by: PSYCHIATRY & NEUROLOGY

## 2023-12-11 PROCEDURE — 3008F BODY MASS INDEX DOCD: CPT | Mod: CPTII,,, | Performed by: PSYCHIATRY & NEUROLOGY

## 2023-12-11 PROCEDURE — 3075F SYST BP GE 130 - 139MM HG: CPT | Mod: CPTII,,, | Performed by: PSYCHIATRY & NEUROLOGY

## 2023-12-11 PROCEDURE — 99214 OFFICE O/P EST MOD 30 MIN: CPT | Mod: S$PBB,,, | Performed by: PSYCHIATRY & NEUROLOGY

## 2023-12-11 PROCEDURE — 99999 PR PBB SHADOW E&M-EST. PATIENT-LVL III: ICD-10-PCS | Mod: PBBFAC,,, | Performed by: PSYCHIATRY & NEUROLOGY

## 2023-12-11 PROCEDURE — 3008F PR BODY MASS INDEX (BMI) DOCUMENTED: ICD-10-PCS | Mod: CPTII,,, | Performed by: PSYCHIATRY & NEUROLOGY

## 2023-12-11 PROCEDURE — 3044F HG A1C LEVEL LT 7.0%: CPT | Mod: CPTII,,, | Performed by: PSYCHIATRY & NEUROLOGY

## 2023-12-11 PROCEDURE — 3075F PR MOST RECENT SYSTOLIC BLOOD PRESS GE 130-139MM HG: ICD-10-PCS | Mod: CPTII,,, | Performed by: PSYCHIATRY & NEUROLOGY

## 2023-12-11 PROCEDURE — 99214 PR OFFICE/OUTPT VISIT, EST, LEVL IV, 30-39 MIN: ICD-10-PCS | Mod: S$PBB,,, | Performed by: PSYCHIATRY & NEUROLOGY

## 2023-12-11 RX ORDER — AMLODIPINE BESYLATE 10 MG/1
10 TABLET ORAL DAILY
Qty: 30 TABLET | Refills: 5 | Status: SHIPPED | OUTPATIENT
Start: 2023-12-11 | End: 2024-01-10

## 2023-12-11 RX ORDER — GABAPENTIN 600 MG/1
600 TABLET ORAL 3 TIMES DAILY
Qty: 90 TABLET | Refills: 6 | Status: SHIPPED | OUTPATIENT
Start: 2023-12-11

## 2023-12-11 RX ORDER — CLOPIDOGREL BISULFATE 75 MG/1
75 TABLET ORAL DAILY
Qty: 30 TABLET | Refills: 11 | Status: SHIPPED | OUTPATIENT
Start: 2023-12-11 | End: 2024-01-18 | Stop reason: SDUPTHER

## 2023-12-11 RX ORDER — TIZANIDINE 4 MG/1
4 TABLET ORAL EVERY 8 HOURS PRN
Qty: 90 TABLET | Refills: 1 | Status: SHIPPED | OUTPATIENT
Start: 2023-12-11 | End: 2024-01-18 | Stop reason: SDUPTHER

## 2023-12-11 NOTE — PROGRESS NOTES
"Neurology Office Visit  Neurology    Jo Ann Swan is a 63 y.o. female for f/u.  Reports pulsatile tinnitus bilaterally.  Also report neck pain and headache and decreased strength in left hand.  Hears "crunching sound" when bending neck.    ROS:  Review of Systems   All other systems reviewed and are negative.     Past Medical History:   Diagnosis Date    Stroke      History reviewed. No pertinent surgical history.  Family History   Problem Relation Age of Onset    Stroke Mother     Diabetes Mother      Review of patient's allergies indicates:   Allergen Reactions    Nsaids (non-steroidal anti-inflammatory drug) Swelling    Cortisone Other (See Comments)    Mold     Naproxen     Pollen extracts     Prednisone Other (See Comments)       Current Outpatient Medications:     albuterol (PROVENTIL) 2 MG Tab, Take 2 mg by mouth., Disp: , Rfl:     atorvastatin (LIPITOR) 40 MG tablet, Take 1 tablet (40 mg total) by mouth every evening., Disp: 90 tablet, Rfl: 3    clopidogreL (PLAVIX) 75 mg tablet, Take 1 tablet (75 mg total) by mouth once daily., Disp: 30 tablet, Rfl: 11    dextromethorphan-guaiFENesin  mg (MUCINEX DM)  mg per 12 hr tablet, Take 1 tablet by mouth every 12 (twelve) hours., Disp: , Rfl:     diazePAM (VALIUM) 10 MG Tab, Take 10-15 mg by mouth., Disp: , Rfl:     fluticasone propionate (FLONASE) 50 mcg/actuation nasal spray, 1 spray by Nasal route., Disp: , Rfl:     gabapentin (NEURONTIN) 600 MG tablet, Take 600 mg by mouth 3 (three) times daily., Disp: , Rfl:     HYDROcodone-acetaminophen (NORCO) 7.5-325 mg per tablet, Take 1 tablet by mouth 2 (two) times daily., Disp: , Rfl:     nicotine (NICODERM CQ) 21 mg/24 hr, Place 1 patch onto the skin once daily., Disp: 30 patch, Rfl: 1    sumatriptan (IMITREX) 100 MG tablet, Take 100 mg by mouth as needed., Disp: , Rfl:     amLODIPine (NORVASC) 10 MG tablet, Take 1 tablet (10 mg total) by mouth once daily., Disp: 30 tablet, Rfl: 0  Outpatient " Medications Marked as Taking for the 12/11/23 encounter (Office Visit) with Stew Barroso MD   Medication Sig Dispense Refill    albuterol (PROVENTIL) 2 MG Tab Take 2 mg by mouth.      atorvastatin (LIPITOR) 40 MG tablet Take 1 tablet (40 mg total) by mouth every evening. 90 tablet 3    clopidogreL (PLAVIX) 75 mg tablet Take 1 tablet (75 mg total) by mouth once daily. 30 tablet 11    dextromethorphan-guaiFENesin  mg (MUCINEX DM)  mg per 12 hr tablet Take 1 tablet by mouth every 12 (twelve) hours.      diazePAM (VALIUM) 10 MG Tab Take 10-15 mg by mouth.      fluticasone propionate (FLONASE) 50 mcg/actuation nasal spray 1 spray by Nasal route.      gabapentin (NEURONTIN) 600 MG tablet Take 600 mg by mouth 3 (three) times daily.      HYDROcodone-acetaminophen (NORCO) 7.5-325 mg per tablet Take 1 tablet by mouth 2 (two) times daily.      nicotine (NICODERM CQ) 21 mg/24 hr Place 1 patch onto the skin once daily. 30 patch 1    sumatriptan (IMITREX) 100 MG tablet Take 100 mg by mouth as needed.       Social History     Tobacco Use    Smoking status: Every Day     Types: Cigarettes    Smokeless tobacco: Never         Vitals:    12/11/23 1329   BP: 131/81   Pulse: 74     Gen NAD  HEENT NC/AT  CV RRR, no carotid bruits  Resp clear  GI soft  Ext no C/C/E  Neuro  AAOx4  Speech fluent, appropriate  EOMI, PERRLA, VFF  Normal facial strength, sensation  Tongue and palate midline  Motor 4/5 L , o/w 5/5  Sensation intact  DTRs symmetric  Coord intact  Gait Normal    Assessment: Stroke  PCA Occlusion  Bilateral Carotid Calcifications  Cervicogenic Headache  Cervical Radiculopathy    Plan: Order diagnostic cerebral angiogram  Order MRI C-spine  Rx tizanidine 4mg Q8H prn  Continue GBP  Continue Plavix, statin, Norvasc

## 2023-12-12 ENCOUNTER — TELEPHONE (OUTPATIENT)
Dept: NEUROLOGY | Facility: CLINIC | Age: 63
End: 2023-12-12
Payer: MEDICAID

## 2023-12-12 NOTE — TELEPHONE ENCOUNTER
Attempted to call patient to schedule Diagnostic Cerebral Angiogram. Unable to reach. Left vm to call clinic back to proceed with scheduling process.

## 2023-12-18 ENCOUNTER — TELEPHONE (OUTPATIENT)
Dept: NEUROLOGY | Facility: CLINIC | Age: 63
End: 2023-12-18
Payer: MEDICAID

## 2023-12-18 NOTE — TELEPHONE ENCOUNTER
Advised patient about imaging being denied. Advised patient that orders for PT can be placed if interested. Patient declined and states PT won't help. Advised patient to contact insurance company for further assistance regarding imaging denial. Also advised patient to report to ED if pain becomes unbearable.

## 2023-12-18 NOTE — TELEPHONE ENCOUNTER
----- Message from Anastasia Padilla sent at 12/18/2023  7:57 AM CST -----  Regarding: denial  Please be advised that [facility] has denied the MRI Cervical. A peer discussion may be completed to appeal this decision by calling  [phone], ref#[id number] to speak with a clinical review physician. See below for denial reason. Denial has been scanned into . Please let us know how you would like to proceed with this case.    Denial Reason:      Needing notes that say the patient had six weeks of neck exercises in the last six months. They also need the number of visits and the dates.    Thanks,     Anastasia Padilla  [facility]

## 2023-12-19 ENCOUNTER — TELEPHONE (OUTPATIENT)
Dept: NEUROLOGY | Facility: CLINIC | Age: 63
End: 2023-12-19
Payer: MEDICAID

## 2023-12-19 NOTE — TELEPHONE ENCOUNTER
[employer] called stating an MRI was needed but could not be done due to request of PT was needed first. It is stated she has had therapy in hospital. They wanted to know if you could override the request so she can get her MRI.  Thanks  You can call her sister Chelo [phone] or contact [facility] and ask for [place].

## 2023-12-21 ENCOUNTER — TELEPHONE (OUTPATIENT)
Dept: NEUROLOGY | Facility: CLINIC | Age: 63
End: 2023-12-21
Payer: MEDICAID

## 2023-12-21 NOTE — TELEPHONE ENCOUNTER
----- Message from Anastasia Padilla sent at 12/21/2023  7:54 AM CST -----  Will do reconsideration. thanks  ----- Message -----  From: Marcela Vasquez LPN  Sent: 12/20/2023   9:22 AM CST  To: Anastasia Padilla    Patient sent over notes from PT that is scanned in chart. Please resubmit PA with these notes with imaging thanks!

## 2024-01-02 ENCOUNTER — TELEPHONE (OUTPATIENT)
Dept: NEUROLOGY | Facility: CLINIC | Age: 64
End: 2024-01-02
Payer: MEDICAID

## 2024-01-02 NOTE — TELEPHONE ENCOUNTER
Patient states pain located in occipital area and cervical area. Patient describes pain as pressure and also states ringing in ears. Patient MRI cervical spine still saying denied. Below are messages sent to Anastasia regarding PA. Will reach out again to see status of PA. Advised patient to report to the nearest ED for further evaluation. Patient states will wait until Diagnostic Cerebral Angiogram on 1/4. Suggested ED once again. Patient declined states will wait til after angiogram to go to ED.

## 2024-01-03 ENCOUNTER — TELEPHONE (OUTPATIENT)
Dept: NEUROLOGY | Facility: CLINIC | Age: 64
End: 2024-01-03
Payer: MEDICAID

## 2024-01-03 NOTE — TELEPHONE ENCOUNTER
----- Message from Anastasia Padilla sent at 1/2/2024  4:31 PM CST -----  Still pending  ----- Message -----  From: Marcela Vasquez LPN  Sent: 1/2/2024   4:00 PM CST  To: Anastasia Padilla    Checking the status of patient reconsideration for imaging

## 2024-01-04 ENCOUNTER — HOSPITAL ENCOUNTER (OUTPATIENT)
Dept: INTERVENTIONAL RADIOLOGY/VASCULAR | Facility: HOSPITAL | Age: 64
Discharge: HOME | End: 2024-01-04
Attending: PSYCHIATRY & NEUROLOGY | Admitting: PSYCHIATRY & NEUROLOGY
Payer: MEDICAID

## 2024-01-04 VITALS
HEART RATE: 63 BPM | SYSTOLIC BLOOD PRESSURE: 141 MMHG | RESPIRATION RATE: 18 BRPM | OXYGEN SATURATION: 95 % | BODY MASS INDEX: 26.44 KG/M2 | WEIGHT: 168.44 LBS | HEIGHT: 67 IN | TEMPERATURE: 98 F | DIASTOLIC BLOOD PRESSURE: 78 MMHG

## 2024-01-04 DIAGNOSIS — I63.9 CEREBROVASCULAR ACCIDENT (CVA), UNSPECIFIED MECHANISM: ICD-10-CM

## 2024-01-04 DIAGNOSIS — I63.9 CVA (CEREBRAL VASCULAR ACCIDENT): ICD-10-CM

## 2024-01-04 LAB
ANION GAP SERPL CALC-SCNC: 8 MEQ/L
BASOPHILS # BLD AUTO: 0.04 X10(3)/MCL
BASOPHILS NFR BLD AUTO: 0.4 %
BUN SERPL-MCNC: 14.7 MG/DL (ref 9.8–20.1)
CALCIUM SERPL-MCNC: 9.4 MG/DL (ref 8.4–10.2)
CHLORIDE SERPL-SCNC: 110 MMOL/L (ref 98–107)
CO2 SERPL-SCNC: 24 MMOL/L (ref 23–31)
CREAT SERPL-MCNC: 0.81 MG/DL (ref 0.55–1.02)
CREAT/UREA NIT SERPL: 18
EOSINOPHIL # BLD AUTO: 0.12 X10(3)/MCL (ref 0–0.9)
EOSINOPHIL NFR BLD AUTO: 1.1 %
ERYTHROCYTE [DISTWIDTH] IN BLOOD BY AUTOMATED COUNT: 12.6 % (ref 11.5–17)
GFR SERPLBLD CREATININE-BSD FMLA CKD-EPI: >60 MLS/MIN/1.73/M2
GLUCOSE SERPL-MCNC: 110 MG/DL (ref 82–115)
HCT VFR BLD AUTO: 42.6 % (ref 37–47)
HGB BLD-MCNC: 14 G/DL (ref 12–16)
IMM GRANULOCYTES # BLD AUTO: 0.03 X10(3)/MCL (ref 0–0.04)
IMM GRANULOCYTES NFR BLD AUTO: 0.3 %
INR PPP: 0.9
LYMPHOCYTES # BLD AUTO: 3.04 X10(3)/MCL (ref 0.6–4.6)
LYMPHOCYTES NFR BLD AUTO: 28.4 %
MCH RBC QN AUTO: 33.5 PG (ref 27–31)
MCHC RBC AUTO-ENTMCNC: 32.9 G/DL (ref 33–36)
MCV RBC AUTO: 101.9 FL (ref 80–94)
MONOCYTES # BLD AUTO: 0.91 X10(3)/MCL (ref 0.1–1.3)
MONOCYTES NFR BLD AUTO: 8.5 %
NEUTROPHILS # BLD AUTO: 6.55 X10(3)/MCL (ref 2.1–9.2)
NEUTROPHILS NFR BLD AUTO: 61.3 %
NRBC BLD AUTO-RTO: 0 %
PLATELET # BLD AUTO: 287 X10(3)/MCL (ref 130–400)
PLATELETS.RETICULATED NFR BLD AUTO: 4.5 % (ref 0.9–11.2)
PMV BLD AUTO: 10.6 FL (ref 7.4–10.4)
POTASSIUM SERPL-SCNC: 4.5 MMOL/L (ref 3.5–5.1)
PROTHROMBIN TIME: 12.2 SECONDS (ref 12.5–14.5)
RBC # BLD AUTO: 4.18 X10(6)/MCL (ref 4.2–5.4)
SODIUM SERPL-SCNC: 142 MMOL/L (ref 136–145)
WBC # SPEC AUTO: 10.69 X10(3)/MCL (ref 4.5–11.5)

## 2024-01-04 PROCEDURE — 36223 PLACE CATH CAROTID/INOM ART: CPT | Mod: 51,50,, | Performed by: PSYCHIATRY & NEUROLOGY

## 2024-01-04 PROCEDURE — 85610 PROTHROMBIN TIME: CPT | Performed by: PSYCHIATRY & NEUROLOGY

## 2024-01-04 PROCEDURE — 25000003 PHARM REV CODE 250: Performed by: PSYCHIATRY & NEUROLOGY

## 2024-01-04 PROCEDURE — 25500020 PHARM REV CODE 255: Performed by: PSYCHIATRY & NEUROLOGY

## 2024-01-04 PROCEDURE — C1894 INTRO/SHEATH, NON-LASER: HCPCS | Performed by: PSYCHIATRY & NEUROLOGY

## 2024-01-04 PROCEDURE — C1769 GUIDE WIRE: HCPCS | Performed by: PSYCHIATRY & NEUROLOGY

## 2024-01-04 PROCEDURE — 80048 BASIC METABOLIC PNL TOTAL CA: CPT | Performed by: PSYCHIATRY & NEUROLOGY

## 2024-01-04 PROCEDURE — 36226 PLACE CATH VERTEBRAL ART: CPT | Mod: 50,,, | Performed by: PSYCHIATRY & NEUROLOGY

## 2024-01-04 PROCEDURE — C1887 CATHETER, GUIDING: HCPCS | Performed by: PSYCHIATRY & NEUROLOGY

## 2024-01-04 PROCEDURE — 99152 MOD SED SAME PHYS/QHP 5/>YRS: CPT | Performed by: PSYCHIATRY & NEUROLOGY

## 2024-01-04 PROCEDURE — 85025 COMPLETE CBC W/AUTO DIFF WBC: CPT | Performed by: PSYCHIATRY & NEUROLOGY

## 2024-01-04 PROCEDURE — 36224 PLACE CATH CAROTD ART: CPT | Mod: 50

## 2024-01-04 PROCEDURE — 99152 MOD SED SAME PHYS/QHP 5/>YRS: CPT | Mod: ,,, | Performed by: PSYCHIATRY & NEUROLOGY

## 2024-01-04 PROCEDURE — 36226 PLACE CATH VERTEBRAL ART: CPT | Mod: 50 | Performed by: PSYCHIATRY & NEUROLOGY

## 2024-01-04 PROCEDURE — 63600175 PHARM REV CODE 636 W HCPCS: Performed by: PSYCHIATRY & NEUROLOGY

## 2024-01-04 PROCEDURE — C1760 CLOSURE DEV, VASC: HCPCS | Performed by: PSYCHIATRY & NEUROLOGY

## 2024-01-04 PROCEDURE — 36223 PLACE CATH CAROTID/INOM ART: CPT | Mod: 50 | Performed by: PSYCHIATRY & NEUROLOGY

## 2024-01-04 RX ORDER — DIAZEPAM 5 MG/1
10 TABLET ORAL
Status: DISCONTINUED | OUTPATIENT
Start: 2024-01-04 | End: 2024-01-05 | Stop reason: HOSPADM

## 2024-01-04 RX ORDER — LIDOCAINE HYDROCHLORIDE 20 MG/ML
INJECTION, SOLUTION INFILTRATION; PERINEURAL
Status: COMPLETED | OUTPATIENT
Start: 2024-01-04 | End: 2024-01-04

## 2024-01-04 RX ORDER — FENTANYL CITRATE 50 UG/ML
INJECTION, SOLUTION INTRAMUSCULAR; INTRAVENOUS
Status: COMPLETED | OUTPATIENT
Start: 2024-01-04 | End: 2024-01-04

## 2024-01-04 RX ORDER — SODIUM CHLORIDE 9 MG/ML
INJECTION, SOLUTION INTRAVENOUS CONTINUOUS
Status: DISCONTINUED | OUTPATIENT
Start: 2024-01-04 | End: 2024-01-05 | Stop reason: HOSPADM

## 2024-01-04 RX ORDER — MIDAZOLAM HYDROCHLORIDE 1 MG/ML
INJECTION INTRAMUSCULAR; INTRAVENOUS
Status: COMPLETED | OUTPATIENT
Start: 2024-01-04 | End: 2024-01-04

## 2024-01-04 RX ORDER — HYDROCODONE BITARTRATE AND ACETAMINOPHEN 5; 325 MG/1; MG/1
1 TABLET ORAL EVERY 4 HOURS PRN
Status: DISCONTINUED | OUTPATIENT
Start: 2024-01-04 | End: 2024-01-05 | Stop reason: HOSPADM

## 2024-01-04 RX ADMIN — IOPAMIDOL 100 ML: 755 INJECTION, SOLUTION INTRAVENOUS at 01:01

## 2024-01-04 RX ADMIN — DIAZEPAM 10 MG: 5 TABLET ORAL at 11:01

## 2024-01-04 RX ADMIN — MIDAZOLAM 1 MG: 1 INJECTION INTRAMUSCULAR; INTRAVENOUS at 12:01

## 2024-01-04 RX ADMIN — FENTANYL CITRATE 50 MCG: 50 INJECTION INTRAMUSCULAR; INTRAVENOUS at 12:01

## 2024-01-04 RX ADMIN — LIDOCAINE HYDROCHLORIDE 5 ML: 20 INJECTION, SOLUTION INFILTRATION; PERINEURAL at 12:01

## 2024-01-04 RX ADMIN — SODIUM CHLORIDE: 9 INJECTION, SOLUTION INTRAVENOUS at 10:01

## 2024-01-04 RX ADMIN — HYDROCODONE BITARTRATE AND ACETAMINOPHEN 1 TABLET: 5; 325 TABLET ORAL at 12:01

## 2024-01-04 NOTE — DISCHARGE INSTRUCTIONS
Remove dressing in 24hrs.  - Can shower in 24hrs, use soap and water only.   -No driving for two Days  -Do not lift anything heavier than a gallon of milk for 5 days.  -Do not submerge site under water for 5 days.   -No lotions, powders, creams around site for 5 days.  - Return to the nearest emergency room if you start running a fever; have any kind of discharge coming from the site, the site looks red or swollen.  - If site starts to bleed, lay flat on the ground, apply pressure to the site and call 911.

## 2024-01-05 NOTE — OP NOTE
[facility]                       [address] [place] [address]    PATIENT NAME:      ANIBAL TOURE  YOB: 1960  CSN:               [id number]  MRN:               [id number]  ADMIT DATE:        01/04/2024 07:25:40  PHYSICIAN:         Stew Barroso MD                          OPERATIVE REPORT      DATE OF SURGERY:    01/04/2024 00:00:00    SURGEON:  Stew Barroso MD    PREOPERATIVE DIAGNOSIS:  Pulsatile tenderness.    POSTOPERATIVE DIAGNOSIS:  Left middle cerebral artery M2 branch stenosis.    PROCEDURE PERFORMED:  Cerebral angiogram with catheter insertion of the   following arteries:  Right common carotid, right subclavian, right vertebral,   left common carotid, left subclavian, left vertebral.    LEVEL OF SEDATION:  Conscious sedation.  Sedation administered by independent   trained observer under attending supervision with continuous monitoring of the   patient's level of consciousness and physiologic status.  Total intraservice   sedation time 30 minutes.    COMPLICATIONS:  None.    DETAILED DESCRIPTION:  Following informed consent, the patient was prepped and   draped in usual sterile fashion.  I infiltrated the right groin with local   anesthetic and punctured the right femoral artery placing a 5-Faroese sheath   without incident.  I introduced my catheter and wire and advanced them to the   aortic arch.  I selected the right common carotid artery.  Angiographic images   demonstrated a normal bifurcation.  Cerebral AP and lateral views via right   common carotid artery injection demonstrated no evidence of significant   stenosis, mass effect, vascular malformation, or early venous drainage.  I then   selected the right subclavian artery.  Angiographic images demonstrated no   stenosis at the origin of the right vertebral artery.  I selected the right   vertebral artery.  Cerebral AP and lateral  views demonstrated no evidence of   significant stenosis, mass effect, vascular malformation, or early venous   drainage.  I then selected the left common carotid artery.  Angiographic images   demonstrated normal bifurcation.  Cerebral AP, lateral, and transfacial views   via left common carotid artery injection demonstrated moderate stenosis of   superior M2 branch of the left middle cerebral artery.  This did not appear to   be flow limiting.  I then selected the left subclavian artery.  Angiographic   images demonstrated no stenosis at the origin of the left vertebral artery.  I   selected a left vertebral artery.  Cerebral AP and lateral views demonstrated no   evidence of significant stenosis, mass effect, vascular malformation, or early   venous drainage.  I removed the catheter.  Hemostasis was achieved using a Mynx   closure device.  The patient tolerated procedure well.  No apparent   complication.        ______________________________  Stew Barroso MD    DEP/AQS  DD:  01/04/2024  Time:  01:58PM  DT:  01/04/2024  Time:  09:14PM  Job #:  [id number]      OPERATIVE REPORT

## 2024-01-08 RX ORDER — IBUPROFEN 200 MG
TABLET ORAL
Qty: 28 PATCH | Refills: 1 | Status: SHIPPED | OUTPATIENT
Start: 2024-01-08 | End: 2024-01-18 | Stop reason: SDUPTHER

## 2024-01-15 PROBLEM — I63.9 STROKE: Status: RESOLVED | Noted: 2023-10-11 | Resolved: 2024-01-15

## 2024-01-18 ENCOUNTER — OFFICE VISIT (OUTPATIENT)
Dept: NEUROLOGY | Facility: CLINIC | Age: 64
End: 2024-01-18
Payer: MEDICAID

## 2024-01-18 VITALS
HEIGHT: 67 IN | WEIGHT: 168 LBS | BODY MASS INDEX: 26.37 KG/M2 | DIASTOLIC BLOOD PRESSURE: 81 MMHG | HEART RATE: 68 BPM | SYSTOLIC BLOOD PRESSURE: 128 MMHG

## 2024-01-18 DIAGNOSIS — I63.9 CEREBROVASCULAR ACCIDENT (CVA), UNSPECIFIED MECHANISM: ICD-10-CM

## 2024-01-18 DIAGNOSIS — E78.5 HYPERLIPIDEMIA, UNSPECIFIED HYPERLIPIDEMIA TYPE: Primary | ICD-10-CM

## 2024-01-18 PROCEDURE — 3079F DIAST BP 80-89 MM HG: CPT | Mod: CPTII,,, | Performed by: NURSE PRACTITIONER

## 2024-01-18 PROCEDURE — 1160F RVW MEDS BY RX/DR IN RCRD: CPT | Mod: CPTII,,, | Performed by: NURSE PRACTITIONER

## 2024-01-18 PROCEDURE — 1159F MED LIST DOCD IN RCRD: CPT | Mod: CPTII,,, | Performed by: NURSE PRACTITIONER

## 2024-01-18 PROCEDURE — 99214 OFFICE O/P EST MOD 30 MIN: CPT | Mod: S$PBB,,, | Performed by: NURSE PRACTITIONER

## 2024-01-18 PROCEDURE — 99213 OFFICE O/P EST LOW 20 MIN: CPT | Mod: PBBFAC | Performed by: NURSE PRACTITIONER

## 2024-01-18 PROCEDURE — 99999 PR PBB SHADOW E&M-EST. PATIENT-LVL III: CPT | Mod: PBBFAC,,, | Performed by: NURSE PRACTITIONER

## 2024-01-18 PROCEDURE — 3008F BODY MASS INDEX DOCD: CPT | Mod: CPTII,,, | Performed by: NURSE PRACTITIONER

## 2024-01-18 PROCEDURE — 3074F SYST BP LT 130 MM HG: CPT | Mod: CPTII,,, | Performed by: NURSE PRACTITIONER

## 2024-01-18 RX ORDER — CLOPIDOGREL BISULFATE 75 MG/1
75 TABLET ORAL DAILY
Qty: 30 TABLET | Refills: 11 | Status: SHIPPED | OUTPATIENT
Start: 2024-01-18 | End: 2025-01-17

## 2024-01-18 RX ORDER — ATORVASTATIN CALCIUM 40 MG/1
40 TABLET, FILM COATED ORAL NIGHTLY
Qty: 90 TABLET | Refills: 3 | Status: SHIPPED | OUTPATIENT
Start: 2024-01-18 | End: 2025-01-17

## 2024-01-18 RX ORDER — IBUPROFEN 200 MG
TABLET ORAL
Qty: 28 PATCH | Refills: 1 | Status: SHIPPED | OUTPATIENT
Start: 2024-01-18

## 2024-01-18 RX ORDER — TIZANIDINE 4 MG/1
4 TABLET ORAL EVERY 8 HOURS PRN
Qty: 90 TABLET | Refills: 1 | Status: SHIPPED | OUTPATIENT
Start: 2024-01-18

## 2024-01-18 NOTE — PROGRESS NOTES
Subjective:      Patient ID: Jo Ann Swan is a 63 y.o. female.    Chief Complaint:  Stroke (F/u angiogram. Patient denies any pain or swelling drainage at the site.Patient states HA and blurred vision. MRI results )      History of Present Illness  Patient presents for follow up after cerebral angiogram. Angiogram did not show any flow limiting stenosis. Today patient denies any new onset of numbness, tingling or weakness. Today, patient reports she is still having neck pain and headaches. States that she has had cervical and lumbar surgery in the past. Feels like her neck is cracking and popping. States pain medication is not helpful at reducing pain. Has seen Dr. Sheets in [place] for her spine surgery in the past.      MRI C SPINE     CLINICAL HISTORY:  Cervical radiculopathy, no red flags;.  Radiculopathy, cervical region     TECHNIQUE:  Multiplanar, multisequence MR images of the cervical spine were acquired without the administration of contrast.     COMPARISON:  MRI 06/08/2017     FINDINGS:  C4 through C7 ACDF with interbody spacer.  The osseous fusion appears solid.  There is degenerative endplate edema at T2-T3.  There is disc space narrowing at T2-T3 however edema appears more centrally located and extends in the pedicles and T3.  The prevertebral soft tissues are unremarkable.  The spinous processes are intact.  The cervicomedullary junction is well preserved.  The cord is normal in signal, caliber and contour.  Normal cervical arterial flow voids are present.  The following findings/abnormalities are noted level by level:     C2-3: Uncovertebral spurring and facet disease contributes to moderate bilateral neural foraminal narrowing.     C3-4: Disc osteophyte complex with facet disease contributes to severe bilateral neural foraminal narrowing and mild central canal stenosis.     C4-5: Uncovertebral spurring and facet disease contributes to moderate to severe bilateral neural foraminal narrowing.      C5-6: Uncovertebral spurring and facet disease contributes to moderate bilateral neural foraminal narrowing.     C6-7: Uncovertebral spurring and facet disease contributes to moderate to severe bilateral neural foraminal narrowing.     C7-T1: Uncovertebral spurring and facet disease contributes to moderate left neural foraminal narrowing     T1-T2: Disc bulging and facet disease contributes to moderate bilateral neural foraminal narrowing.     T2-T3: Circumferential disc bulging with facet disease contributes to severe bilateral neural foraminal stenosis.     Impression:     Suspected degenerative endplate edema at T2 and T3 however edema extends into the posterior elements of T3.  A nondisplaced subacute T3 compression deformity cannot be excluded..     Prior C4 through C7 ACDF which appears solid.     Multilevel cervical spondylosis and facet disease with multilevel neural foraminal narrowing as detailed above.     Mild central canal stenosis C3-4     This report was flagged in Epic as abnormal.        Electronically signed by: Keegan Miles  DATE OF SURGERY:    01/04/2024 00:00:00     SURGEON:  Stew Barroso MD     PREOPERATIVE DIAGNOSIS:  Pulsatile tenderness.     POSTOPERATIVE DIAGNOSIS:  Left middle cerebral artery M2 branch stenosis.     PROCEDURE PERFORMED:  Cerebral angiogram with catheter insertion of the   following arteries:  Right common carotid, right subclavian, right vertebral,   left common carotid, left subclavian, left vertebral.     LEVEL OF SEDATION:  Conscious sedation.  Sedation administered by independent   trained observer under attending supervision with continuous monitoring of the   patient's level of consciousness and physiologic status.  Total intraservice   sedation time 30 minutes.     COMPLICATIONS:  None.     DETAILED DESCRIPTION:  Following informed consent, the patient was prepped and   draped in usual sterile fashion.  I infiltrated the right groin with local   anesthetic  and punctured the right femoral artery placing a 5-Hungarian sheath   without incident.  I introduced my catheter and wire and advanced them to the   aortic arch.  I selected the right common carotid artery.  Angiographic images   demonstrated a normal bifurcation.  Cerebral AP and lateral views via right   common carotid artery injection demonstrated no evidence of significant   stenosis, mass effect, vascular malformation, or early venous drainage.  I then   selected the right subclavian artery.  Angiographic images demonstrated no   stenosis at the origin of the right vertebral artery.  I selected the right   vertebral artery.  Cerebral AP and lateral views demonstrated no evidence of   significant stenosis, mass effect, vascular malformation, or early venous   drainage.  I then selected the left common carotid artery.  Angiographic images   demonstrated normal bifurcation.  Cerebral AP, lateral, and transfacial views   via left common carotid artery injection demonstrated moderate stenosis of   superior M2 branch of the left middle cerebral artery.  This did not appear to   be flow limiting.  I then selected the left subclavian artery.  Angiographic   images demonstrated no stenosis at the origin of the left vertebral artery.  I   selected a left vertebral artery.  Cerebral AP and lateral views demonstrated no   evidence of significant stenosis, mass effect, vascular malformation, or early   venous drainage.  I removed the catheter.  Hemostasis was achieved using a Mynx   closure device.  The patient tolerated procedure well.  No apparent   complication.           ______________________________  Stew Barroso MD  Past Medical History:   Diagnosis Date    Meningitis spinal     Stroke        History reviewed. No pertinent surgical history.    Family History   Problem Relation Age of Onset    Stroke Mother     Diabetes Mother        Social History     Socioeconomic History    Marital status: Single   Tobacco Use     Smoking status: Every Day     Types: Cigarettes    Smokeless tobacco: Never   Substance and Sexual Activity    Alcohol use: Never    Drug use: Never     Social Determinants of Health     Financial Resource Strain: Low Risk  (10/12/2023)    Overall Financial Resource Strain (CARDIA)     Difficulty of Paying Living Expenses: Not hard at all   Food Insecurity: No Food Insecurity (10/12/2023)    Hunger Vital Sign     Worried About Running Out of Food in the Last Year: Never true     Ran Out of Food in the Last Year: Never true   Transportation Needs: No Transportation Needs (10/12/2023)    PRAPARE - Transportation     Lack of Transportation (Medical): No     Lack of Transportation (Non-Medical): No   Physical Activity: Inactive (10/12/2023)    Exercise Vital Sign     Days of Exercise per Week: 0 days     Minutes of Exercise per Session: 0 min   Stress: No Stress Concern Present (10/12/2023)    [place] [employer] - Occupational Stress Questionnaire     Feeling of Stress : Only a little   Social Connections: Unknown (10/12/2023)    Social Connection and Isolation Panel [NHANES]     Frequency of Communication with Friends and Family: More than three times a week     Frequency of Social Gatherings with Friends and Family: More than three times a week     Attends Gnosticism Services: Never     Active Member of Clubs or Organizations: No     Attends Club or Organization Meetings: Never   Housing Stability: Low Risk  (10/12/2023)    Housing Stability Vital Sign     Unable to Pay for Housing in the Last Year: No     Number of Places Lived in the Last Year: 1     Unstable Housing in the Last Year: No       Current Outpatient Medications   Medication Sig Dispense Refill    albuterol (PROVENTIL) 2 MG Tab Take 2 mg by mouth.      dextromethorphan-guaiFENesin  mg (MUCINEX DM)  mg per 12 hr tablet Take 1 tablet by mouth every 12 (twelve) hours.      diazePAM (VALIUM) 10 MG Tab Take 10-15 mg by  "mouth.      fluticasone propionate (FLONASE) 50 mcg/actuation nasal spray 1 spray by Nasal route.      gabapentin (NEURONTIN) 600 MG tablet Take 1 tablet (600 mg total) by mouth 3 (three) times daily. 90 tablet 6    HYDROcodone-acetaminophen (NORCO) 7.5-325 mg per tablet Take 1 tablet by mouth 2 (two) times daily.      sumatriptan (IMITREX) 100 MG tablet Take 100 mg by mouth as needed.      amLODIPine (NORVASC) 10 MG tablet Take 1 tablet (10 mg total) by mouth once daily. 30 tablet 5    atorvastatin (LIPITOR) 40 MG tablet Take 1 tablet (40 mg total) by mouth every evening. 90 tablet 3    clopidogreL (PLAVIX) 75 mg tablet Take 1 tablet (75 mg total) by mouth once daily. 30 tablet 11    nicotine (NICODERM CQ) 21 mg/24 hr apply ONE patch to the skin once daily 28 patch 1    tiZANidine (ZANAFLEX) 4 MG tablet Take 1 tablet (4 mg total) by mouth every 8 (eight) hours as needed (Neck pain, stiffness, headache). 90 tablet 1     No current facility-administered medications for this visit.       Review of patient's allergies indicates:   Allergen Reactions    Nsaids (non-steroidal anti-inflammatory drug) Swelling    Cortisone Other (See Comments)    Mold     Naproxen     Pollen extracts     Prednisone Other (See Comments)      Vitals:    01/18/24 1040   BP: 128/81   BP Location: Left arm   Patient Position: Sitting   Pulse: 68   Weight: 76.2 kg (168 lb)   Height: 5' 7" (1.702 m)      Review of Systems  12 point ROS performed and negative unless otherwise documented in HPI  Objective:     Neurologic Exam     Mental Status   Oriented to person, place, and time.   Speech: speech is normal   Level of consciousness: alert    Cranial Nerves     CN III, IV, VI   Pupils are equal, round, and reactive to light.  Extraocular motions are normal.     CN V   Facial sensation intact.     CN VII   Facial expression full, symmetric.   Right visual field cut     Motor Exam   Muscle bulk: normal    Strength   Strength 5/5 throughout. "     Sensory Exam   Light touch normal.     Gait, Coordination, and Reflexes Antalgic, ambulates with a cane       Physical Exam  Vitals reviewed.   Eyes:      Extraocular Movements: EOM normal.      Pupils: Pupils are equal, round, and reactive to light.   Cardiovascular:      Rate and Rhythm: Normal rate and regular rhythm.   Pulmonary:      Effort: Pulmonary effort is normal.   Neurological:      Mental Status: She is oriented to person, place, and time.      Motor: Motor strength is normal.  Psychiatric:         Speech: Speech normal.          Assessment:     1. Hyperlipidemia, unspecified hyperlipidemia type    2. Cerebrovascular accident (CVA), unspecified mechanism        Plan:   Rx for plavix/statin/nicoderm patch sent to patient's pharmacy  Educated patient on smoking cessation/risk factors for stroke  Reviewed angiogram results with patient; no further treatment. Continue to manage medical  Referral sent to Dr. Sheets in [place] for further eval of MRI C spine

## 2024-02-05 PROBLEM — Z98.1 HX OF FUSION OF CERVICAL SPINE: Status: ACTIVE | Noted: 2024-02-05

## 2024-02-05 PROBLEM — Z72.0 TOBACCO ABUSE: Status: ACTIVE | Noted: 2024-02-05

## 2024-02-05 PROBLEM — M47.812 FACET HYPERTROPHY OF CERVICAL REGION: Status: ACTIVE | Noted: 2024-02-05

## 2024-02-05 PROBLEM — M47.812 CERVICAL SPONDYLOSIS: Status: ACTIVE | Noted: 2024-02-05

## 2024-02-05 PROBLEM — M48.02 DEGENERATIVE CERVICAL SPINAL STENOSIS: Status: ACTIVE | Noted: 2024-02-05

## 2024-06-22 ENCOUNTER — HOSPITAL ENCOUNTER (EMERGENCY)
Facility: HOSPITAL | Age: 64
Discharge: HOME | End: 2024-06-22
Attending: EMERGENCY MEDICINE
Payer: MEDICAID

## 2024-06-22 VITALS
RESPIRATION RATE: 18 BRPM | WEIGHT: 160 LBS | TEMPERATURE: 98 F | SYSTOLIC BLOOD PRESSURE: 134 MMHG | BODY MASS INDEX: 25.11 KG/M2 | DIASTOLIC BLOOD PRESSURE: 87 MMHG | HEART RATE: 71 BPM | HEIGHT: 67 IN | OXYGEN SATURATION: 96 %

## 2024-06-22 DIAGNOSIS — M79.18 MUSCULOSKELETAL PAIN: Primary | ICD-10-CM

## 2024-06-22 DIAGNOSIS — R10.10 UPPER ABDOMINAL PAIN: ICD-10-CM
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
## 2024-06-22 LAB
ALBUMIN SERPL-MCNC: 3.9 G/DL (ref 3.4–4.8)
ALBUMIN/GLOB SERPL: 1.2 RATIO (ref 1.1–2)
ALP SERPL-CCNC: 103 UNIT/L (ref 40–150)
ALT SERPL-CCNC: 22 UNIT/L (ref 0–55)
ANION GAP SERPL CALC-SCNC: 12 MEQ/L
AST SERPL-CCNC: 19 UNIT/L (ref 5–34)
BACTERIA #/AREA URNS AUTO: NORMAL /HPF
BASOPHILS # BLD AUTO: 0.02 X10(3)/MCL
BASOPHILS NFR BLD AUTO: 0.2 %
BILIRUB SERPL-MCNC: 0.2 MG/DL
BILIRUB UR QL STRIP.AUTO: NEGATIVE
BUN SERPL-MCNC: 16.2 MG/DL (ref 9.8–20.1)
CALCIUM SERPL-MCNC: 9.4 MG/DL (ref 8.4–10.2)
CHLORIDE SERPL-SCNC: 109 MMOL/L (ref 98–107)
CLARITY UR: CLEAR
CO2 SERPL-SCNC: 21 MMOL/L (ref 23–31)
COLOR UR AUTO: NORMAL
CREAT SERPL-MCNC: 0.9 MG/DL (ref 0.55–1.02)
CREAT/UREA NIT SERPL: 18
EOSINOPHIL # BLD AUTO: 0.18 X10(3)/MCL (ref 0–0.9)
EOSINOPHIL NFR BLD AUTO: 1.7 %
ERYTHROCYTE [DISTWIDTH] IN BLOOD BY AUTOMATED COUNT: 12.8 % (ref 11.5–17)
GFR SERPLBLD CREATININE-BSD FMLA CKD-EPI: >60 ML/MIN/1.73/M2
GLOBULIN SER-MCNC: 3.3 GM/DL (ref 2.4–3.5)
GLUCOSE SERPL-MCNC: 95 MG/DL (ref 82–115)
GLUCOSE UR QL STRIP: NORMAL
HCT VFR BLD AUTO: 41.2 % (ref 37–47)
HGB BLD-MCNC: 14.1 G/DL (ref 12–16)
HGB UR QL STRIP: NEGATIVE
IMM GRANULOCYTES # BLD AUTO: 0.02 X10(3)/MCL (ref 0–0.04)
IMM GRANULOCYTES NFR BLD AUTO: 0.2 %
KETONES UR QL STRIP: NEGATIVE
LEUKOCYTE ESTERASE UR QL STRIP: NEGATIVE
LIPASE SERPL-CCNC: 16 U/L
LYMPHOCYTES # BLD AUTO: 3.69 X10(3)/MCL (ref 0.6–4.6)
LYMPHOCYTES NFR BLD AUTO: 35.3 %
MCH RBC QN AUTO: 33.6 PG (ref 27–31)
MCHC RBC AUTO-ENTMCNC: 34.2 G/DL (ref 33–36)
MCV RBC AUTO: 98.1 FL (ref 80–94)
MONOCYTES # BLD AUTO: 0.8 X10(3)/MCL (ref 0.1–1.3)
MONOCYTES NFR BLD AUTO: 7.7 %
NEUTROPHILS # BLD AUTO: 5.74 X10(3)/MCL (ref 2.1–9.2)
NEUTROPHILS NFR BLD AUTO: 54.9 %
NITRITE UR QL STRIP: NEGATIVE
NRBC BLD AUTO-RTO: 0 %
OHS QRS DURATION: 92 MS
OHS QTC CALCULATION: 437 MS
PH UR STRIP: 6.5 [PH]
PLATELET # BLD AUTO: 332 X10(3)/MCL (ref 130–400)
PMV BLD AUTO: 9.8 FL (ref 7.4–10.4)
POTASSIUM SERPL-SCNC: 3.9 MMOL/L (ref 3.5–5.1)
PROT SERPL-MCNC: 7.2 GM/DL (ref 5.8–7.6)
PROT UR QL STRIP: NEGATIVE
RBC # BLD AUTO: 4.2 X10(6)/MCL (ref 4.2–5.4)
RBC #/AREA URNS AUTO: NORMAL /HPF
SODIUM SERPL-SCNC: 142 MMOL/L (ref 136–145)
SP GR UR STRIP.AUTO: 1.02 (ref 1–1.03)
SQUAMOUS #/AREA URNS LPF: NORMAL /HPF
TROPONIN I SERPL-MCNC: <0.01 NG/ML (ref 0–0.04)
UROBILINOGEN UR STRIP-ACNC: NORMAL
WBC # BLD AUTO: 10.45 X10(3)/MCL (ref 4.5–11.5)
WBC #/AREA URNS AUTO: NORMAL /HPF

## 2024-06-22 PROCEDURE — 81001 URINALYSIS AUTO W/SCOPE: CPT

## 2024-06-22 PROCEDURE — 93010 ELECTROCARDIOGRAM REPORT: CPT | Mod: ,,, | Performed by: INTERNAL MEDICINE

## 2024-06-22 PROCEDURE — 84484 ASSAY OF TROPONIN QUANT: CPT

## 2024-06-22 PROCEDURE — 93005 ELECTROCARDIOGRAM TRACING: CPT

## 2024-06-22 PROCEDURE — 80053 COMPREHEN METABOLIC PANEL: CPT

## 2024-06-22 PROCEDURE — 25000003 PHARM REV CODE 250

## 2024-06-22 PROCEDURE — 85025 COMPLETE CBC W/AUTO DIFF WBC: CPT

## 2024-06-22 PROCEDURE — 99285 EMERGENCY DEPT VISIT HI MDM: CPT | Mod: 25

## 2024-06-22 PROCEDURE — 83690 ASSAY OF LIPASE: CPT

## 2024-06-22 RX ORDER — TIZANIDINE 4 MG/1
4 TABLET ORAL EVERY 8 HOURS PRN
Qty: 12 TABLET | Refills: 0 | Status: SHIPPED | OUTPATIENT
Start: 2024-06-22

## 2024-06-22 RX ORDER — TIZANIDINE 4 MG/1
4 TABLET ORAL
Status: COMPLETED | OUTPATIENT
Start: 2024-06-22 | End: 2024-06-22

## 2024-06-22 RX ORDER — ACETAMINOPHEN 500 MG
1000 TABLET ORAL
Status: COMPLETED | OUTPATIENT
Start: 2024-06-22 | End: 2024-06-22

## 2024-06-22 RX ADMIN — ACETAMINOPHEN 1000 MG: 500 TABLET ORAL at 07:06

## 2024-06-22 RX ADMIN — TIZANIDINE 4 MG: 4 TABLET ORAL at 07:06

## 2024-06-23 NOTE — ED PROVIDER NOTES
Encounter Date: 6/22/2024       History     Chief Complaint   Patient presents with    Rib pain      C/o right rib/ right upper abdq x 1.5 weeks she reports that she fell about a week ago and she is worried that she might have a broken rib and wants get check out. Pt awake alert MAEE      63 y.o. White female with a history of hyperlipidemia, hypertension, and COPD presents to Emergency Department with a chief complaint of RUQ abdominal pain. Symptoms began 1.5 weeks ago and have been constant since onset. Patient unsure what caused pain; reports she has had multiple falls since pain began. Also, states while having a BM several days ago, she strained, and pain worsened in area. Associated symptoms include myalgias. Symptoms are aggravated with palpation and there are no alleviating factors. The patient denies CP, SOB, dizziness, vomiting, or abdominal pain. No other reported symptoms at this time      The history is provided by the patient. No  was used.   Abdominal Pain  The current episode started several weeks ago. The onset of the illness was abrupt. The problem has not changed since onset.The abdominal pain radiates to the RUQ. The other symptoms of the illness do not include fever, shortness of breath, nausea, vomiting, diarrhea or dysuria.   The patient states that she believes she is currently not pregnant. The patient has not had a change in bowel habit. Symptoms associated with the illness do not include chills, diaphoresis, heartburn, constipation, hematuria or frequency.     Review of patient's allergies indicates:   Allergen Reactions    Nsaids (non-steroidal anti-inflammatory drug) Swelling    Cortisone Other (See Comments)    Mold     Naproxen     Pollen extracts     Prednisone Other (See Comments)     Past Medical History:   Diagnosis Date    Allergies     Anemia, unspecified     Anxiety disorder, unspecified     Arthritis     Chronic neck and back pain     COPD (chronic  obstructive pulmonary disease)     HTN (hypertension)     Meningitis spinal     Meningitis spinal     Mixed hyperlipidemia     Stroke      Past Surgical History:   Procedure Laterality Date    CHOLECYSTECTOMY      HERNIA REPAIR      SPINE SURGERY      TUMOR REMOVAL       Family History   Problem Relation Name Age of Onset    Heart disease Mother      Vision loss Mother      Early death Mother      Cancer Mother      Stroke Mother      Diabetes Mother      Arthritis Mother      Early death Father      Arthritis Father      Alcohol abuse Father      Early death Sister      Cancer Sister      COPD Sister      Depression Sister      Drug abuse Sister      Mental illness Sister      Early death Brother       Social History     Tobacco Use    Smoking status: Every Day     Types: Cigarettes    Smokeless tobacco: Never   Substance Use Topics    Alcohol use: Never    Drug use: Never     Review of Systems   Constitutional:  Negative for chills, diaphoresis and fever.   Eyes:  Negative for photophobia and visual disturbance.   Respiratory:  Negative for cough, shortness of breath, wheezing and stridor.    Cardiovascular:  Negative for chest pain, palpitations and leg swelling.   Gastrointestinal:  Positive for abdominal pain. Negative for constipation, diarrhea, heartburn, nausea and vomiting.   Genitourinary:  Negative for dysuria, flank pain, frequency and hematuria.   Musculoskeletal:  Positive for myalgias.   Skin:  Negative for color change and wound.   All other systems reviewed and are negative.      Physical Exam     Initial Vitals [06/22/24 1908]   BP Pulse Resp Temp SpO2   133/79 80 18 98.4 °F (36.9 °C) 100 %      MAP       --         Physical Exam    Nursing note and vitals reviewed.  Constitutional: She appears well-developed and well-nourished. She is not diaphoretic. She is cooperative.  Non-toxic appearance. No distress.   HENT:   Head: Normocephalic and atraumatic.   Right Ear: External ear normal.   Left Ear:  External ear normal.   Nose: Nose normal.   Eyes: Conjunctivae and EOM are normal. Pupils are equal, round, and reactive to light.   Neck: Neck supple.   Normal range of motion.  Cardiovascular:  Normal rate, regular rhythm, S1 normal, S2 normal, normal heart sounds, intact distal pulses and normal pulses.           Pulmonary/Chest: Effort normal and breath sounds normal. No tachypnea and no bradypnea. No respiratory distress. She has no decreased breath sounds. She has no wheezes. She has no rhonchi. She has no rales. She exhibits no tenderness.   Abdominal: Abdomen is soft. Bowel sounds are normal. She exhibits no distension. There is abdominal tenderness in the right upper quadrant.   Tenderness noted to RUQ; no discoloration or swelling noted. No obvious injury or trauma noted.  There is no rebound.   Musculoskeletal:         General: Normal range of motion.      Cervical back: Normal range of motion and neck supple.     Neurological: She is alert and oriented to person, place, and time. She has normal strength. No sensory deficit. GCS score is 15. GCS eye subscore is 4. GCS verbal subscore is 5. GCS motor subscore is 6.   Skin: Skin is warm and dry. Capillary refill takes less than 2 seconds.   Psychiatric: She has a normal mood and affect. Thought content normal.         ED Course   Procedures  Labs Reviewed   COMPREHENSIVE METABOLIC PANEL - Abnormal; Notable for the following components:       Result Value    Chloride 109 (*)     CO2 21 (*)     All other components within normal limits   CBC WITH DIFFERENTIAL - Abnormal; Notable for the following components:    MCV 98.1 (*)     MCH 33.6 (*)     All other components within normal limits   LIPASE - Normal   TROPONIN I - Normal   URINALYSIS, REFLEX TO URINE CULTURE - Normal   CBC W/ AUTO DIFFERENTIAL    Narrative:     The following orders were created for panel order CBC Auto Differential.  Procedure                               Abnormality         Status                      ---------                               -----------         ------                     CBC with Differential[[id number]]       Abnormal            Final result                 Please view results for these tests on the individual orders.     EKG Readings: (Independently Interpreted)   Initial Reading: No STEMI. Rhythm: Normal Sinus Rhythm. Heart Rate: 75.     ECG Results              EKG 12-lead (Final result)        Collection Time Result Time QRS Duration OHS QTC Calculation    06/22/24 19:02:25 06/22/24 21:16:18 92 437                     Final result by Interface, Lab In [facility] (06/22/24 21:16:23)                   Narrative:    Test Reason : R10.10,    Vent. Rate : 075 BPM     Atrial Rate : 075 BPM     P-R Int : 162 ms          QRS Dur : 092 ms      QT Int : 392 ms       P-R-T Axes : 085 -69 080 degrees     QTc Int : 437 ms    Normal sinus rhythm  Incomplete right bundle branch block  Left anterior fascicular block  Abnormal ECG  When compared with ECG of 11-OCT-2023 11:19,  No significant change was found  Confirmed by Isaías Liriano MD ([id number]) on 6/22/2024 9:16:15 PM    Referred By: [email or web address]   SELF           Confirmed By:Isaías Liriano MD                                  Imaging Results              X-Ray Chest PA And Lateral (Final result)  Result time 06/22/24 19:56:51      Final result by Papo Capellan MD (06/22/24 19:56:51)                   Impression:      No acute cardiopulmonary process identified.      Electronically signed by: Papo Capellan  Date:    06/22/2024  Time:    19:56               Narrative:    EXAMINATION:  XR CHEST PA AND LATERAL    CLINICAL HISTORY:  Unspecified fall, initial encounter    TECHNIQUE:  Two-view    COMPARISON:  April 16, 2019.    FINDINGS:  Cardiopericardial silhouette is within normal limits.  Lungs are hyperinflated with chronic interstitial changes.  No acute dense focal or segmental consolidation, congestion, pleural effusion or pneumothorax.  Previous  ACDF.                                       Medications   tiZANidine tablet 4 mg (4 mg Oral Given 6/22/24 1949)   acetaminophen tablet 1,000 mg (1,000 mg Oral Given 6/22/24 1949)     Medical Decision Making  Patient awake, alert, has non-labored breathing, and follows commands appropriately. Arrived to ED due to RUQ abdominal pain. Concerned about possible rib fracture due to previous falls while at home. Afebrile. Ambulatory without difficulty. NAD Noted.          Differential Diagnosis: Rib Contusion, Fall, Abdominal Pain, GERD, Muscle Strain    Amount and/or Complexity of Data Reviewed  Labs: ordered.     Details: Labs unremarkable. Informed patient of results.   Radiology: ordered. Decision-making details documented in ED Course.     Details: Informed patient of results.   ECG/medicine tests: ordered.  Discussion of management or test interpretation with external provider(s): Discussed plan of care and interventions with patient. Agreed to and aware of plan of care. Comfortable being discharged home. Patient discharged home. Patient denies new or additional complaints; no further tests indicated at this time. Verbalized understanding of instructions. No emergent or apparent distress noted prior to discharge. To follow up with PCP in 1 week as needed. Strict ER return precautions given.       Risk  OTC drugs.  Prescription drug management.               ED Course as of 06/22/24 2129   Sat Jun 22, 2024 1959 X-Ray Chest PA And Lateral  No acute cardiopulmonary process identified. [JA]   2108 Patient labs and imaging unremarkable. Patient reports pain only with movement; likely musculoskeletal in nature. Will prescribe Zanaflex for symptoms. Cautioned on side effects. Comfortable with plan of care and being discharged home. Strict ER return precautions given.  [JA]      ED Course User Index  [JA] [place] Pugh, NP                           Clinical Impression:  Final diagnoses:  [R10.10] Upper abdominal  pain  [W19.XXXA] Fall  [M79.18] Musculoskeletal pain (Primary)          ED Disposition Condition    Discharge Stable          ED Prescriptions       Medication Sig Dispense Start Date End Date Auth. Provider    tiZANidine (ZANAFLEX) 4 MG tablet Take 1 tablet (4 mg total) by mouth every 8 (eight) hours as needed (Take as needed every 8 hours for muscle pain.). 12 tablet 6/22/2024 -- [place] Pugh, NP          Follow-up Information       Follow up With Specialties Details Why Contact Info    PCP  Call in 1 week As needed, If symptoms worsen     [facility] - Emergency Dept Emergency Medicine Go to  If symptoms worsen, As needed [address] [facility] [place] [id number] [phone]             Jamaica Pugh, NP  06/22/24 [id number]

## 2024-06-23 NOTE — FIRST PROVIDER EVALUATION
"Medical screening examination initiated.  I have conducted a focused provider triage encounter, findings are as follows:    Brief history of present illness:  63 year old female presents to ER with c/o RUQ abdominal pain x 1.5 weeks. Denies nausea or vomiting. She does reports a fall one week ago. Denies hitting her head or neck.    Vitals:    06/22/24 1908   BP: 133/79   Pulse: 80   Resp: 18   Temp: 98.4 °F (36.9 °C)   TempSrc: Oral   SpO2: 100%   Weight: 72.6 kg (160 lb)   Height: 5' 7" (1.702 m)       Pertinent physical exam:  Awake and alert, NAD    Brief workup plan:  Labs, UA, imaging     Preliminary workup initiated; this workup will be continued and followed by the physician or advanced practice provider that is assigned to the patient when roomed.  "

## 2024-06-23 NOTE — DISCHARGE INSTRUCTIONS
Thanks for letting us take care of you today!  It is our goal to give you courteous care and to keep you comfortable and informed, if you have any questions before you leave I will be happy to try and answer them.    Here is some advice after your visit:      Your visit in the emergency department is NOT definitive care - please follow-up with your primary care doctor and/or specialist within 1 week.  Please return if you have any worsening in your condition or if you have any other concerns.    If you had radiology exams like an XRAY or CT in the emergency Department the interpreation on them may be preliminary - there may be less time sensitive findings on the reports please obtain these reports within 24 hours from the hospital or by using your out on your mobile phone to access records.  Bring these to your primary care doctor and/or specialist for further review of incidental findings.    Please review any LAB WORK from your visit today with your primary care physician.    You have been prescribed Tizanidine for muscle spasms/pain. Please do not take this medication while working, drinking alcohol, swimming, or while driving/operating heavy machinery. This medication may cause drowsiness, dizziness, impair judgment, and reduce physical capabilities.You should not drive, operate heavy machinery, or make life changing decisions while taking this medication.        While in the Emergency Department you received medication that may cause drowsiness, dizziness, impaired judgment, and reduced physical capabilities. You should not drive, operate heavy machinery, swim, or make life  changing decisions within 24 hours of receiving this medication.

## 2024-06-26 RX ORDER — IBUPROFEN 200 MG
1 TABLET ORAL DAILY
Qty: 28 PATCH | Refills: 0 | Status: SHIPPED | OUTPATIENT
Start: 2024-06-26

## 2024-06-26 RX ORDER — IBUPROFEN 200 MG
TABLET ORAL
Qty: 28 PATCH | Refills: 1 | Status: CANCELLED | OUTPATIENT
Start: 2024-06-26

## 2024-08-03 DIAGNOSIS — R51.9 ACUTE INTRACTABLE HEADACHE, UNSPECIFIED HEADACHE TYPE: ICD-10-CM

## 2024-08-05 RX ORDER — GABAPENTIN 600 MG/1
600 TABLET ORAL 3 TIMES DAILY
Qty: 90 TABLET | Refills: 0 | Status: SHIPPED | OUTPATIENT
Start: 2024-08-05

## 2024-09-04 ENCOUNTER — OFFICE VISIT (OUTPATIENT)
Dept: OPHTHALMOLOGY | Facility: CLINIC | Age: 64
End: 2024-09-04
Payer: MEDICAID

## 2024-09-04 VITALS — BODY MASS INDEX: 25.12 KG/M2 | WEIGHT: 160.06 LBS | HEIGHT: 67 IN

## 2024-09-04 DIAGNOSIS — I63.9 CEREBROVASCULAR ACCIDENT (CVA), UNSPECIFIED MECHANISM: Primary | ICD-10-CM

## 2024-09-04 DIAGNOSIS — H25.13 AGE-RELATED NUCLEAR CATARACT OF BOTH EYES: ICD-10-CM

## 2024-09-04 DIAGNOSIS — H53.461 HEMIANOPIA, HOMONYMOUS, RIGHT: ICD-10-CM

## 2024-09-04 PROCEDURE — 99213 OFFICE O/P EST LOW 20 MIN: CPT | Mod: PBBFAC,PN | Performed by: STUDENT IN AN ORGANIZED HEALTH CARE EDUCATION/TRAINING PROGRAM

## 2024-09-04 NOTE — PROGRESS NOTES
HPI    6 months Vision check       Last edited by Cassy Ortiz MA on 9/4/2024 12:58 PM.            Assessment /Plan     For exam results, see Encounter Report.    Cerebrovascular accident (CVA), unspecified mechanism    Age-related nuclear cataract of both eyes    Hemianopia, homonymous, right          HVF 24-2 11/2023  OD 0/11 FL, 0% FP/FN, right hemifield defect  OS 2/15 FL, 0% FP/FN, right hemifield defect      Stroke Left PCA w/ Right homonymous hemianopia   - Episode occurred 10/10/23  - Numbness tingling right hand, right sided visual defecits   - right hemidefect HVF 11/2023- observe  - mrx 9/4/24 20/25 ou, dfe stable, cont yearly dfe ou   - cont neurology/pcp follow ups   - PT does not drive currently 9/4/24 discussed that due to VF defects recommend not driving as is unsafe. Pt understands      2 NSC  3 Hyperopia w/astigmatism and presbyopia  - NVS, mrx today 9/4/24  - CTM yearly dfe    4 Dry Eyes OU  - AT, WC  - CTM    RTC 1 year dfe ou

## 2024-10-07 ENCOUNTER — TELEPHONE (OUTPATIENT)
Dept: NEUROLOGY | Facility: CLINIC | Age: 64
End: 2024-10-07
Payer: MEDICAID

## 2024-10-07 DIAGNOSIS — I63.9 CEREBROVASCULAR ACCIDENT (CVA), UNSPECIFIED MECHANISM: ICD-10-CM

## 2024-10-07 RX ORDER — AMLODIPINE BESYLATE 10 MG/1
10 TABLET ORAL DAILY
Qty: 30 TABLET | Refills: 1 | Status: SHIPPED | OUTPATIENT
Start: 2024-10-07 | End: 2024-12-06

## 2024-10-07 RX ORDER — ATORVASTATIN CALCIUM 40 MG/1
40 TABLET, FILM COATED ORAL NIGHTLY
Qty: 30 TABLET | Refills: 1 | Status: SHIPPED | OUTPATIENT
Start: 2024-10-07 | End: 2024-12-06

## 2024-10-07 RX ORDER — CLOPIDOGREL BISULFATE 75 MG/1
75 TABLET ORAL DAILY
Qty: 30 TABLET | Refills: 11 | Status: SHIPPED | OUTPATIENT
Start: 2024-10-07 | End: 2025-10-07

## 2024-10-07 NOTE — TELEPHONE ENCOUNTER
Patient called asking for refill of bp med, cholesterol med and plavix. She does not currently have a PCP. Advised staff that I will fill 2 month supply but patient will need to get a PCP.

## 2024-10-07 NOTE — TELEPHONE ENCOUNTER
----- Message from Nurse Xochitl sent at 10/7/2024  9:47 AM CDT -----  Regarding: THANKI CLINICAL MESSAGE  Mon 7-Oct-24 07:54a TAKEN     To:          Ofc  From:        Jo Ann Swan  Phone:      [phone]  Patient:     Same  :           RegDr:      Unknown  Ref:         rx refills advd her appt is on the  and she is out of meds pls call      Subject:         Patient Calls  ClrID:   [phone]

## 2024-10-18 ENCOUNTER — OFFICE VISIT (OUTPATIENT)
Dept: NEUROLOGY | Facility: CLINIC | Age: 64
End: 2024-10-18
Payer: MEDICAID

## 2024-10-18 VITALS
DIASTOLIC BLOOD PRESSURE: 73 MMHG | WEIGHT: 160.06 LBS | SYSTOLIC BLOOD PRESSURE: 104 MMHG | HEIGHT: 67 IN | HEART RATE: 67 BPM | BODY MASS INDEX: 25.12 KG/M2

## 2024-10-18 DIAGNOSIS — Z98.890 H/O CERVICAL SPINE SURGERY: ICD-10-CM

## 2024-10-18 DIAGNOSIS — I63.9 CEREBROVASCULAR ACCIDENT (CVA), UNSPECIFIED MECHANISM: Primary | ICD-10-CM

## 2024-10-18 DIAGNOSIS — M47.812 CERVICAL SPONDYLOSIS: ICD-10-CM

## 2024-10-18 DIAGNOSIS — R51.9 ACUTE INTRACTABLE HEADACHE, UNSPECIFIED HEADACHE TYPE: ICD-10-CM

## 2024-10-18 PROCEDURE — 99214 OFFICE O/P EST MOD 30 MIN: CPT | Mod: PBBFAC | Performed by: NURSE PRACTITIONER

## 2024-10-18 PROCEDURE — 99999 PR PBB SHADOW E&M-EST. PATIENT-LVL IV: CPT | Mod: PBBFAC,,, | Performed by: NURSE PRACTITIONER

## 2024-10-18 RX ORDER — AMITRIPTYLINE HYDROCHLORIDE 25 MG/1
25 TABLET, FILM COATED ORAL NIGHTLY
Qty: 90 TABLET | Refills: 0 | Status: SHIPPED | OUTPATIENT
Start: 2024-10-18 | End: 2025-10-18

## 2024-10-18 RX ORDER — GABAPENTIN 600 MG/1
600 TABLET ORAL 3 TIMES DAILY
Qty: 90 TABLET | Refills: 0 | Status: SHIPPED | OUTPATIENT
Start: 2024-10-18

## 2024-10-18 NOTE — PROGRESS NOTES
"Subjective:      Patient ID: Jo Ann Swan is a 64 y.o. female.    Chief Complaint:  Stroke (9 month follow up DX:CVA. Patient states she is experiencing reoccurring headaches in the back of her head that "never go away". Headaches are associated with dizziness, weakness, and blurred vision. Patient states that medication does not relieve the pain. /)      History of Present Illness  Patient presents for follow up of stroke. Patient had a past cerebral angiogram that was normal. Patient had atheroembolic PCA stroke in October of 2023 that left her with a right visual field cut. Today, patient is focused mostly on her pain. She has a history of cervical fusion with Dr. Sheets in the past. She was previously prescribed Gabapentin by her PCP. Reports gabapentin is helpful for neck pain and head pain. Denies any new onset of stroke symptoms such as numbness, tingling or weakness. Patient reports she saw Dr. Sheets after last office visit and states they did not do anything. Patient does not drive. Patient's PCP Dr. Sousa does not accept her insurance so she does not have anyone to fill her medication at the present time.         Past Medical History:   Diagnosis Date    Allergies     Anemia, unspecified     Anxiety disorder, unspecified     Arthritis     Chronic neck and back pain     COPD (chronic obstructive pulmonary disease)     HTN (hypertension)     Meningitis spinal     Meningitis spinal     Mixed hyperlipidemia     Stroke        Past Surgical History:   Procedure Laterality Date    CHOLECYSTECTOMY      HERNIA REPAIR      SPINE SURGERY      TUMOR REMOVAL         Family History   Problem Relation Name Age of Onset    Heart disease Mother      Vision loss Mother      Early death Mother      Cancer Mother      Stroke Mother      Diabetes Mother      Arthritis Mother      Early death Father      Arthritis Father      Alcohol abuse Father      Early death Sister      Cancer Sister      COPD Sister      Depression " Sister      Drug abuse Sister      Mental illness Sister      Early death Brother         Social History     Socioeconomic History    Marital status: Single   Tobacco Use    Smoking status: Every Day     Current packs/day: 0.50     Average packs/day: 0.5 packs/day for 49.8 years (24.9 ttl pk-yrs)     Types: Cigarettes     Start date: 1975    Smokeless tobacco: Never   Substance and Sexual Activity    Alcohol use: Never    Drug use: Never     Social Drivers of Health     Financial Resource Strain: Low Risk  (10/12/2023)    Overall Financial Resource Strain (CARDIA)     Difficulty of Paying Living Expenses: Not hard at all   Food Insecurity: No Food Insecurity (10/12/2023)    Hunger Vital Sign     Worried About Running Out of Food in the Last Year: Never true     Ran Out of Food in the Last Year: Never true   Transportation Needs: No Transportation Needs (10/12/2023)    PRAPARE - Transportation     Lack of Transportation (Medical): No     Lack of Transportation (Non-Medical): No   Physical Activity: Inactive (10/12/2023)    Exercise Vital Sign     Days of Exercise per Week: 0 days     Minutes of Exercise per Session: 0 min   Stress: No Stress Concern Present (10/12/2023)    [employer] - Occupational Stress Questionnaire     Feeling of Stress : Only a little   Housing Stability: Low Risk  (10/12/2023)    Housing Stability Vital Sign     Unable to Pay for Housing in the Last Year: No     Number of Places Lived in the Last Year: 1     Unstable Housing in the Last Year: No       Current Outpatient Medications   Medication Sig Dispense Refill    albuterol (PROVENTIL) 2 MG Tab Take 2 mg by mouth.      amLODIPine (NORVASC) 10 MG tablet Take 1 tablet (10 mg total) by mouth once daily. 30 tablet 1    atorvastatin (LIPITOR) 40 MG tablet Take 1 tablet (40 mg total) by mouth every evening. 30 tablet 1    clopidogreL (PLAVIX) 75 mg tablet Take 1 tablet (75 mg total) by mouth once daily. 30 tablet 11  "   dextromethorphan-guaiFENesin  mg (MUCINEX DM)  mg per 12 hr tablet Take 1 tablet by mouth every 12 (twelve) hours.      diazePAM (VALIUM) 10 MG Tab Take 10-15 mg by mouth.      fluticasone propionate (FLONASE) 50 mcg/actuation nasal spray 1 spray by Nasal route.      gabapentin (NEURONTIN) 600 MG tablet TAKE 1 Tablet BY MOUTH THREE TIMES DAILY 90 tablet 0    HYDROcodone-acetaminophen (NORCO) 7.5-325 mg per tablet Take 1 tablet by mouth 2 (two) times daily.      nicotine (NICODERM CQ) 14 mg/24 hr Place 1 patch onto the skin once daily. 28 patch 0    tiZANidine (ZANAFLEX) 4 MG tablet Take 1 tablet (4 mg total) by mouth every 8 (eight) hours as needed (Neck pain, stiffness, headache). 90 tablet 1    tiZANidine (ZANAFLEX) 4 MG tablet Take 1 tablet (4 mg total) by mouth every 8 (eight) hours as needed (Take as needed every 8 hours for muscle pain.). 12 tablet 0     No current facility-administered medications for this visit.       Review of patient's allergies indicates:   Allergen Reactions    Nsaids (non-steroidal anti-inflammatory drug) Swelling    Cortisone Other (See Comments)    Mold     Naproxen     Pollen extracts     Prednisone Other (See Comments)      Vitals:    10/18/24 1135   BP: 104/73   BP Location: Left arm   Patient Position: Sitting   Pulse: 67   Weight: 72.6 kg (160 lb 0.9 oz)   Height: 5' 7" (1.702 m)          Review of Systems  12 point ROS performed and negative unless otherwise documented in HPI  Objective:     Neurologic Exam      Mental Status   Oriented to person, place, and time.   Speech: speech is normal   Level of consciousness: alert     Cranial Nerves      CN III, IV, VI   Pupils are equal, round, and reactive to light.  Extraocular motions are normal.      CN V   Facial sensation intact.      CN VII   Facial expression full, symmetric.   Right visual field cut      Motor Exam   Muscle bulk: normal     Strength   Strength 5/5 throughout.      Sensory Exam   Light touch " normal.      Gait, Coordination, and Reflexes Antalgic, ambulates with a cane         Physical Exam  Vitals reviewed.   Eyes:      Extraocular Movements: EOM normal.      Pupils: Pupils are equal, round, and reactive to light.   Cardiovascular:      Rate and Rhythm: Normal rate and regular rhythm.   Pulmonary:      Effort: Pulmonary effort is normal.   Neurological:      Mental Status: She is oriented to person, place, and time.      Motor: Motor strength is normal.  Psychiatric:         Speech: Speech normal.     Assessment:     1. Cerebrovascular accident (CVA), unspecified mechanism    2. Cervical spondylosis    3. H/O cervical spine surgery        Plan:   Still smoking. Recommend smoking cessation. Patient has tried nicotine patch in the past with no help  Patient had presented to office last week for medication refill of BP medication and cholesterol medication. I filled for 2 months but patient will have to present to PCP for any further refills.  Rx for amitriptyline 25 mg QHS sent to patient's pharmacy  Referral to [facility] Internal Medicine clinic  Referral to [facility] Neurology Clinic  From a stroke standpoint, patient does not need to follow up in stroke clinic. PCP can manage risk factors of HLD, HTN      Time spent in patient visit 50 minutes coordinating care, prescription medication refills, evaluating and formulating treatment plan.

## 2024-11-20 DIAGNOSIS — R51.9 ACUTE INTRACTABLE HEADACHE, UNSPECIFIED HEADACHE TYPE: ICD-10-CM

## 2024-11-20 RX ORDER — GABAPENTIN 600 MG/1
600 TABLET ORAL 3 TIMES DAILY
Qty: 90 TABLET | Refills: 0 | Status: SHIPPED | OUTPATIENT
Start: 2024-11-20

## 2024-11-24 DIAGNOSIS — R51.9 ACUTE INTRACTABLE HEADACHE, UNSPECIFIED HEADACHE TYPE: ICD-10-CM

## 2024-11-24 DIAGNOSIS — I63.9 CEREBROVASCULAR ACCIDENT (CVA), UNSPECIFIED MECHANISM: ICD-10-CM

## 2024-11-26 RX ORDER — AMLODIPINE BESYLATE 10 MG/1
10 TABLET ORAL DAILY
Qty: 30 TABLET | Refills: 1 | OUTPATIENT
Start: 2024-11-26 | End: 2025-01-25

## 2024-11-26 RX ORDER — ATORVASTATIN CALCIUM 40 MG/1
40 TABLET, FILM COATED ORAL NIGHTLY
Qty: 30 TABLET | Refills: 1 | OUTPATIENT
Start: 2024-11-26 | End: 2025-01-25

## 2024-11-26 RX ORDER — GABAPENTIN 600 MG/1
600 TABLET ORAL 3 TIMES DAILY
Qty: 90 TABLET | Refills: 0 | OUTPATIENT
Start: 2024-11-26

## 2024-11-26 RX ORDER — CLOPIDOGREL BISULFATE 75 MG/1
75 TABLET ORAL DAILY
Qty: 30 TABLET | Refills: 11 | OUTPATIENT
Start: 2024-11-26 | End: 2025-11-26

## 2024-12-04 RX ORDER — ATORVASTATIN CALCIUM 40 MG/1
TABLET, FILM COATED ORAL
Qty: 30 TABLET | Refills: 1 | Status: SHIPPED | OUTPATIENT
Start: 2024-12-04

## 2025-01-06 DIAGNOSIS — I63.9 CEREBROVASCULAR ACCIDENT (CVA), UNSPECIFIED MECHANISM: ICD-10-CM

## 2025-01-07 RX ORDER — AMLODIPINE BESYLATE 10 MG/1
10 TABLET ORAL DAILY
Qty: 21 TABLET | Refills: 0 | Status: SHIPPED | OUTPATIENT
Start: 2025-01-07 | End: 2025-03-08

## 2025-02-07 ENCOUNTER — OFFICE VISIT (OUTPATIENT)
Dept: FAMILY MEDICINE | Facility: CLINIC | Age: 65
End: 2025-02-07
Payer: MEDICAID

## 2025-02-07 VITALS
OXYGEN SATURATION: 99 % | HEART RATE: 58 BPM | BODY MASS INDEX: 23.29 KG/M2 | TEMPERATURE: 98 F | HEIGHT: 67 IN | DIASTOLIC BLOOD PRESSURE: 78 MMHG | SYSTOLIC BLOOD PRESSURE: 120 MMHG | RESPIRATION RATE: 15 BRPM | WEIGHT: 148.38 LBS

## 2025-02-07 DIAGNOSIS — Z11.4 ENCOUNTER FOR SCREENING FOR HIV: ICD-10-CM

## 2025-02-07 DIAGNOSIS — H53.469 CVA, OLD, HOMONYMOUS HEMIANOPSIA: ICD-10-CM

## 2025-02-07 DIAGNOSIS — H61.91 SKIN LESION OF RIGHT EXTERNAL EAR: ICD-10-CM

## 2025-02-07 DIAGNOSIS — Z11.59 ENCOUNTER FOR HEPATITIS C SCREENING TEST FOR LOW RISK PATIENT: ICD-10-CM

## 2025-02-07 DIAGNOSIS — G89.29 CHRONIC NECK AND BACK PAIN: ICD-10-CM

## 2025-02-07 DIAGNOSIS — M54.9 CHRONIC NECK AND BACK PAIN: ICD-10-CM

## 2025-02-07 DIAGNOSIS — I10 PRIMARY HYPERTENSION: ICD-10-CM

## 2025-02-07 DIAGNOSIS — M54.2 CHRONIC NECK AND BACK PAIN: ICD-10-CM

## 2025-02-07 DIAGNOSIS — Z12.31 BREAST CANCER SCREENING BY MAMMOGRAM: ICD-10-CM

## 2025-02-07 DIAGNOSIS — F41.9 ANXIETY DISORDER, UNSPECIFIED TYPE: Primary | ICD-10-CM

## 2025-02-07 DIAGNOSIS — E78.2 MIXED HYPERLIPIDEMIA: ICD-10-CM

## 2025-02-07 DIAGNOSIS — Z79.891 LONG TERM (CURRENT) USE OF OPIATE ANALGESIC: ICD-10-CM

## 2025-02-07 DIAGNOSIS — I69.398 CVA, OLD, HOMONYMOUS HEMIANOPSIA: ICD-10-CM

## 2025-02-07 DIAGNOSIS — Z00.00 WELL ADULT EXAM: ICD-10-CM

## 2025-02-07 PROBLEM — N39.3 STRESS INCONTINENCE: Status: ACTIVE | Noted: 2017-06-26

## 2025-02-07 PROBLEM — G56.00 CARPAL TUNNEL SYNDROME: Status: RESOLVED | Noted: 2025-02-07 | Resolved: 2025-02-07

## 2025-02-07 PROBLEM — K43.9 HERNIA OF ANTERIOR ABDOMINAL WALL: Status: ACTIVE | Noted: 2025-02-07

## 2025-02-07 PROBLEM — B19.10 HEPATITIS B VIRUS INFECTION: Status: RESOLVED | Noted: 2025-02-07 | Resolved: 2025-02-07

## 2025-02-07 PROBLEM — M47.816 LUMBAR SPONDYLOSIS: Status: ACTIVE | Noted: 2017-06-08

## 2025-02-07 PROBLEM — J44.9 CHRONIC OBSTRUCTIVE PULMONARY DISEASE: Status: ACTIVE | Noted: 2025-02-07

## 2025-02-07 PROBLEM — M19.90 OSTEOARTHRITIS: Status: ACTIVE | Noted: 2025-02-07

## 2025-02-07 PROCEDURE — 1160F RVW MEDS BY RX/DR IN RCRD: CPT | Mod: CPTII,,,

## 2025-02-07 PROCEDURE — 3008F BODY MASS INDEX DOCD: CPT | Mod: CPTII,,,

## 2025-02-07 PROCEDURE — 1159F MED LIST DOCD IN RCRD: CPT | Mod: CPTII,,,

## 2025-02-07 PROCEDURE — 3078F DIAST BP <80 MM HG: CPT | Mod: CPTII,,,

## 2025-02-07 PROCEDURE — 3074F SYST BP LT 130 MM HG: CPT | Mod: CPTII,,,

## 2025-02-07 PROCEDURE — 99204 OFFICE O/P NEW MOD 45 MIN: CPT | Mod: ,,,

## 2025-02-07 RX ORDER — AMLODIPINE BESYLATE 10 MG/1
10 TABLET ORAL DAILY
Qty: 21 TABLET | Refills: 0 | Status: SHIPPED | OUTPATIENT
Start: 2025-02-07 | End: 2025-03-03 | Stop reason: SDUPTHER

## 2025-02-07 RX ORDER — ATORVASTATIN CALCIUM 40 MG/1
40 TABLET, FILM COATED ORAL NIGHTLY
Qty: 30 TABLET | Refills: 1 | Status: SHIPPED | OUTPATIENT
Start: 2025-02-07

## 2025-02-07 RX ORDER — CLOPIDOGREL BISULFATE 75 MG/1
75 TABLET ORAL DAILY
Qty: 30 TABLET | Refills: 11 | Status: SHIPPED | OUTPATIENT
Start: 2025-02-07 | End: 2026-02-07

## 2025-02-07 RX ORDER — DULOXETIN HYDROCHLORIDE 30 MG/1
30 CAPSULE, DELAYED RELEASE ORAL DAILY
Qty: 30 CAPSULE | Refills: 11 | Status: SHIPPED | OUTPATIENT
Start: 2025-02-07 | End: 2025-03-03 | Stop reason: SDUPTHER

## 2025-02-07 RX ORDER — GABAPENTIN 600 MG/1
600 TABLET ORAL 3 TIMES DAILY
Qty: 90 TABLET | Refills: 0 | Status: SHIPPED | OUTPATIENT
Start: 2025-02-07

## 2025-02-07 NOTE — LETTER
AUTHORIZATION FOR RELEASE OF   CONFIDENTIAL INFORMATION    Dear Dr. Mesfin Sousa,    We are seeing Jo Ann Swan, date of birth 1960, in the clinic at [facility]. Jody Torrez MD is the patient's PCP. Jo Ann Swan has an outstanding lab/procedure at the time we reviewed her chart. In order to help keep her health information updated, she has authorized us to request the following medical record(s):        (  )  MAMMOGRAM                                      (  )  COLONOSCOPY      (  )  PAP SMEAR                                          ( X )  OUTSIDE LAB RESULTS     (  )  DEXA SCAN                                          (  )  EYE EXAM            (  )  FOOT EXAM                                          ( X )  ENTIRE RECORD x 1 yr     (  )  OUTSIDE IMMUNIZATIONS                 (  )  _______________         Please fax records to [facility], [place], Mary, MD, [phone]      If you have any questions, please contact [phone]            Patient Name: Jo Ann Swan  : 1960  Patient Phone #: [phone]

## 2025-02-07 NOTE — ASSESSMENT & PLAN NOTE
Well controlled.  Continue above regimen    AHA/ACC goal <130/80. JNC8 goal <140/90 (all ages if DM or CKD OR <60), <150/90 (>60 w/o CKD or DM)  Low Sodium Diet (DASH Diet - Less than 2 grams of sodium per day).  Monitor blood pressure daily and log. Report consistent numbers greater than 140/90.  Maintain healthy weight with goal BMI <30. Exercise 30 minutes per day, 5 days per week.  Vaping cessation encouraged

## 2025-02-07 NOTE — ASSESSMENT & PLAN NOTE
Lipid panel today.  Continue above medication for now    Lab Results   Component Value Date    .00 10/11/2023    TRIG 203 (H) 10/11/2023    HDL 37 10/11/2023    TOTALCHOLEST 5 10/11/2023     Educated on importance of dietary modifications. Follow a low cholesterol, low saturated fat diet with less that 200mg of cholesterol a day.  Avoid fried foods and high saturated fats (high saturated fats less than 7% of calories).  Increase dietary fiber.  Regular exercise can reduce LDL and raise HDL. Stressed importance of physical activity 5 times per week for 30 minutes per day.

## 2025-02-07 NOTE — ASSESSMENT & PLAN NOTE
Patient with hyperpigmented, papular, waxy lesion with color variation measuring 6 mm x 20 mm to the helix of the right ear.  Likely an SK, but we will send to Dermatology for biopsy to rule out melanoma.

## 2025-02-07 NOTE — ASSESSMENT & PLAN NOTE
Patient has chronic neck and back pain.  She has a history of ACDF.  She saw a neurosurgeon in [facility] 1 year ago, but he did not recommend surgery.  Patient is currently taking Norco 7.5, gabapentin 600 mg t.i.d., and Valium 10 mg PRN.  Patient does not need a refill of these controlled substances today.  We discussed other options for pain and anxiety control.  We will start duloxetine 30 mg daily with a goal of having her wean off of Valium and Norco.

## 2025-02-07 NOTE — PROGRESS NOTES
[facility]  Jody Torrez MD    Patient ID: [id number]     Chief Complaint: Establish Care      HPI:     Jo Ann Swan is a 64 y.o. female here today to establish care.    Patient reports a skin lesion to her right ear which has been present in enlarging for the last several months.    Medical history:  CVA  Patient had an atheroembolic PCA stroke 2023.  Patient still has right-sided vision loss and unsteady gait at times.  She is followed by Neurology.    Cervical spondylosis    Anxiety    HTN     COPD    Obstetrics: ,     GYN history:  Last menstrual period - early 50s     Social history:  Tobacco - switched to vaping from cigarettes 6 months ago.   Alcohol - none  Illicit substances - marijuana occasionally  Marital status -   Occupation -       Healthcare Maintenance  Colon cancer screening:  Discuss at next visit  Breast cancer screening:  Ordered  Cervical cancer screening:  Patient has not had a Pap smear in several years.  She will think about getting it done here.  HIV screening:  Ordered  Hepatitis-C screening:  Ordered      Past Medical History:   Diagnosis Date    Allergies     Anemia, unspecified     Anxiety disorder, unspecified     Arthritis     Carpal tunnel syndrome 2025    Chronic neck and back pain     COPD (chronic obstructive pulmonary disease)     Hepatitis B virus infection 2025    HTN (hypertension)     Meningitis spinal     Meningitis spinal     Mixed hyperlipidemia     Stroke         Past Surgical History:   Procedure Laterality Date    CHOLECYSTECTOMY      GANGLION CYST EXCISION      R wrist    HERNIA REPAIR      SPINE SURGERY      TUMOR REMOVAL          Social History     Tobacco Use    Smoking status: Every Day     Current packs/day: 0.50     Average packs/day: 0.5 packs/day for 50.1 years (25.1 ttl pk-yrs)     Types: Cigarettes     Start date:     Smokeless tobacco: Never    Tobacco comments:     Vape currently with nicotine  "  Substance and Sexual Activity    Alcohol use: Never    Drug use: Yes     Types: Marijuana    Sexual activity: Not Currently        Current Outpatient Medications   Medication Instructions    amitriptyline (ELAVIL) 25 mg, Oral, Nightly    amLODIPine (NORVASC) 10 mg, Oral, Daily    atorvastatin (LIPITOR) 40 mg, Oral, Nightly    clopidogreL (PLAVIX) 75 mg, Oral, Daily    dextromethorphan-guaiFENesin  mg (MUCINEX DM)  mg per 12 hr tablet 1 tablet, 2 times daily PRN    diazePAM (VALIUM) 10-15 mg    DULoxetine (CYMBALTA) 30 mg, Oral, Daily    fluticasone propionate (FLONASE) 50 mcg/actuation nasal spray 1 spray    gabapentin (NEURONTIN) 600 mg, Oral, 3 times daily    HYDROcodone-acetaminophen (NORCO) 7.5-325 mg per tablet 1 tablet, 2 times daily    nicotine (NICODERM CQ) 14 mg/24 hr 1 patch, Transdermal, Daily    tiZANidine (ZANAFLEX) 4 mg, Oral, Every 8 hours PRN    tiZANidine (ZANAFLEX) 4 mg, Oral, Every 8 hours PRN       Review of patient's allergies indicates:   Allergen Reactions    Nsaids (non-steroidal anti-inflammatory drug) Swelling    Cortisone Other (See Comments)    Mold     Naproxen     Pollen extracts     Prednisone Other (See Comments)        Patient Care Team:  Jody Torrez MD as PCP - General (Family Medicine)  Karissa Gimenez FNP as Nurse Practitioner (Neurology)  Cassy Alex MD as Consulting Physician (Ophthalmology)     Subjective:     Review of Systems    12 point review of systems conducted, negative except as stated in the history of present illness. See HPI for details.    Objective:     Visit Vitals  /78   Pulse (!) 58   Temp 97.7 °F (36.5 °C) (Oral)   Resp 15   Ht 5' 7" (1.702 m)   Wt 67.3 kg (148 lb 6.4 oz)   SpO2 99%   BMI 23.24 kg/m²       Physical Exam  Vitals and nursing note reviewed.   Constitutional:       General: She is not in acute distress.     Appearance: She is not ill-appearing.   HENT:      Head: Normocephalic and atraumatic.   Eyes:      General: No " scleral icterus.     Extraocular Movements: Extraocular movements intact.      Conjunctiva/sclera: Conjunctivae normal.   Cardiovascular:      Rate and Rhythm: Normal rate and regular rhythm.      Heart sounds: No murmur heard.     No friction rub. No gallop.   Pulmonary:      Effort: Pulmonary effort is normal. No respiratory distress.      Breath sounds: Normal breath sounds. No wheezing, rhonchi or rales.   Musculoskeletal:         General: Normal range of motion.      Cervical back: Normal range of motion and neck supple.      Right lower leg: No edema.      Left lower leg: No edema.   Skin:     General: Skin is warm and dry.      Comments:  hyperpigmented, papular, waxy lesion with color variation measuring 6 mm x 20 mm to the helix of the right ear   Neurological:      General: No focal deficit present.      Mental Status: She is alert.   Psychiatric:         Mood and Affect: Mood normal.         Labs Reviewed:     Chemistry:  Lab Results   Component Value Date     06/22/2024    K 3.9 06/22/2024    BUN 16.2 06/22/2024    CREATININE 0.90 06/22/2024    EGFRNORACEVR >60 06/22/2024    GLUCOSE 95 06/22/2024    CALCIUM 9.4 06/22/2024    ALKPHOS 103 06/22/2024    LABPROT 7.2 06/22/2024    ALBUMIN 3.9 06/22/2024    BILIDIR 0.00 05/24/2019    IBILI 0.30 05/24/2019    AST 19 06/22/2024    ALT 22 06/22/2024    MG 2.00 10/12/2023    PHOS 3.8 10/12/2023    TSH 1.843 10/11/2023        Lab Results   Component Value Date    HGBA1C 5.3 10/12/2023        Hematology:  Lab Results   Component Value Date    WBC 10.45 06/22/2024    HGB 14.1 06/22/2024    HCT 41.2 06/22/2024     06/22/2024       Lipid Panel:  Lab Results   Component Value Date    CHOL 201 (H) 10/11/2023    HDL 37 10/11/2023    .00 10/11/2023    TRIG 203 (H) 10/11/2023    TOTALCHOLEST 5 10/11/2023        Urine:  Lab Results   Component Value Date    APPEARANCEUA Clear 06/22/2024    SGUA 1.020 06/22/2024    PROTEINUA Negative 06/22/2024     KETONESUA Negative 06/22/2024    LEUKOCYTESUR Negative 06/22/2024    RBCUA 0-5 06/22/2024    WBCUA 0-5 06/22/2024    BACTERIA None Seen 06/22/2024    SQEPUA Trace 06/22/2024        Assessment:       ICD-10-CM ICD-9-CM   1. Anxiety disorder, unspecified type  F41.9 300.00   2. Chronic neck and back pain  M54.2 723.1    M54.9 724.5    G89.29 338.29   3. Primary hypertension  I10 401.9   4. Skin lesion of right external ear  H61.91 380.9   5. Mixed hyperlipidemia  E78.2 272.2   6. CVA, old, homonymous hemianopsia  I69.398 438.7    H53.469 368.46   7. Well adult exam  Z00.00 V70.0   8. Long term (current) use of opiate analgesic  Z79.891 V58.69   9. Encounter for hepatitis C screening test for low risk patient  Z11.59 V73.89   10. Encounter for screening for HIV  Z11.4 V73.89   11. Breast cancer screening by mammogram  Z12.31 V76.12        Plan:     1. Anxiety disorder, unspecified type  Assessment & Plan:  Patient is currently taking Valium 10 mg once or twice daily for anxiety and sleep.  She has been on this for many years.  We will start her on duloxetine for anxiety and pain with a goal of weaning her Valium.  She just received a refill from her previous PCP.  If she is staying on the Valium, we will need controlled substance agreement and urine drug screen at next visit.  Patient understands that she will need to be seen every 3 months for urine drug screen for refills of Valium.    Orders:  -     DULoxetine (CYMBALTA) 30 MG capsule; Take 1 capsule (30 mg total) by mouth once daily.  Dispense: 30 capsule; Refill: 11    2. Chronic neck and back pain  Assessment & Plan:  Patient has chronic neck and back pain.  She has a history of ACDF.  She saw a neurosurgeon in [facility] 1 year ago, but he did not recommend surgery.  Patient is currently taking Norco 7.5, gabapentin 600 mg t.i.d., and Valium 10 mg PRN.  Patient does not need a refill of these controlled substances today.  We discussed other options for pain and  anxiety control.  We will start duloxetine 30 mg daily with a goal of having her wean off of Valium and Norco.    Orders:  -     DULoxetine (CYMBALTA) 30 MG capsule; Take 1 capsule (30 mg total) by mouth once daily.  Dispense: 30 capsule; Refill: 11  -     gabapentin (NEURONTIN) 600 MG tablet; Take 1 tablet (600 mg total) by mouth 3 (three) times daily.  Dispense: 90 tablet; Refill: 0    3. Primary hypertension  Overview:  Amlodipine 10 mg daily    Assessment & Plan:  Well controlled.  Continue above regimen    AHA/ACC goal <130/80. JNC8 goal <140/90 (all ages if DM or CKD OR <60), <150/90 (>60 w/o CKD or DM)  Low Sodium Diet (DASH Diet - Less than 2 grams of sodium per day).  Monitor blood pressure daily and log. Report consistent numbers greater than 140/90.  Maintain healthy weight with goal BMI <30. Exercise 30 minutes per day, 5 days per week.  Vaping cessation encouraged    Orders:  -     amLODIPine (NORVASC) 10 MG tablet; Take 1 tablet (10 mg total) by mouth once daily.  Dispense: 21 tablet; Refill: 0    4. Skin lesion of right external ear  Assessment & Plan:  Patient with hyperpigmented, papular, waxy lesion with color variation measuring 6 mm x 20 mm to the helix of the right ear.  Likely an SK, but we will send to Dermatology for biopsy to rule out melanoma.    Orders:  -     Ambulatory referral/consult to Dermatology; Future; Expected date: 02/14/2025    5. Mixed hyperlipidemia  Overview:  Lipitor 40 mg HS    Assessment & Plan:  Lipid panel today.  Continue above medication for now    Lab Results   Component Value Date    .00 10/11/2023    TRIG 203 (H) 10/11/2023    HDL 37 10/11/2023    TOTALCHOLEST 5 10/11/2023     Educated on importance of dietary modifications. Follow a low cholesterol, low saturated fat diet with less that 200mg of cholesterol a day.  Avoid fried foods and high saturated fats (high saturated fats less than 7% of calories).  Increase dietary fiber.  Regular exercise can reduce  LDL and raise HDL. Stressed importance of physical activity 5 times per week for 30 minutes per day.       Orders:  -     atorvastatin (LIPITOR) 40 MG tablet; Take 1 tablet (40 mg total) by mouth every evening.  Dispense: 30 tablet; Refill: 1  -     Lipid Panel; Future; Expected date: 05/07/2025    6. CVA, old, homonymous hemianopsia  Overview:  Patient had an atheroembolic PCA stroke October 20, 2023.  Patient still has right-sided vision loss and unsteady gait at times.  She is followed by Neurology.    Orders:  -     clopidogreL (PLAVIX) 75 mg tablet; Take 1 tablet (75 mg total) by mouth once daily.  Dispense: 30 tablet; Refill: 11    7. Well adult exam  -     CBC Auto Differential; Future; Expected date: 03/07/2025  -     Comprehensive Metabolic Panel; Future; Expected date: 03/07/2025  -     Lipid Panel; Future; Expected date: 05/07/2025  -     TSH; Future; Expected date: 03/07/2025  -     Hemoglobin A1C; Future; Expected date: 03/07/2025    8. Long term (current) use of opiate analgesic  -     Drug Screen, Urine; Future; Expected date: 03/07/2025    9. Encounter for hepatitis C screening test for low risk patient  -     Hepatitis C Antibody; Future; Expected date: 03/07/2025    10. Encounter for screening for HIV  -     HIV 1/2 Ag/Ab (4th Gen); Future; Expected date: 03/07/2025    11. Breast cancer screening by mammogram  -     Mammo Digital Screening Bilat w/ Kike; Future; Expected date: 02/07/2025         Follow up in about 4 weeks (around 3/7/2025). In addition to their scheduled follow up, the patient has also been instructed to follow up on as needed basis.     Future Appointments   Date Time Provider Department Center   3/10/2025  8:45 AM Jody Torrez MD [facility]   4/10/2025 11:00 AM Carola Palafox, [facility] NEURO [place]    9/9/2025  1:20 PM PROVIDERS, HESHAM Torrez MD

## 2025-02-07 NOTE — ASSESSMENT & PLAN NOTE
Patient is currently taking Valium 10 mg once or twice daily for anxiety and sleep.  She has been on this for many years.  We will start her on duloxetine for anxiety and pain with a goal of weaning her Valium.  She just received a refill from her previous PCP.  If she is staying on the Valium, we will need controlled substance agreement and urine drug screen at next visit.  Patient understands that she will need to be seen every 3 months for urine drug screen for refills of Valium.

## 2025-02-22 ENCOUNTER — NURSE TRIAGE (OUTPATIENT)
Dept: ADMINISTRATIVE | Facility: CLINIC | Age: 65
End: 2025-02-22
Payer: MEDICAID

## 2025-02-22 NOTE — TELEPHONE ENCOUNTER
Patient reports a lump on her forehead that she noticed this morning. She denies any injury. There is no bruising, just a raised lump that is non tender. She has been having headaches but not in the same area. Dispo provided home care. Patient is very anxious about the lump and would like to speak with her PCP on Monday. Will route message. Instructed to call back with additional questions or worsening of symptoms. Patient verbalized understanding.     Reason for Disposition   [1] Small swelling or lump AND [2] unexplained AND [3] present < 1 week    Additional Information   Negative: Sounds like a life-threatening emergency to the triager   Negative: Patient sounds very sick or weak to the triager   Negative: SEVERE pain (e.g., excruciating)   Negative: [1] Swelling is painful to touch AND [2] fever   Negative: [1] Swelling is red AND [2] fever   Negative: [1] Swelling is red AND [2] size > 2 inches (5.0 cm)  (Exception: Itchy area of skin.)   Negative: [1] Swelling of groin (inguinal area) AND [2] painful   Negative: [1] Swelling is painful to touch AND [2] no fever   Negative: Looks like a boil, infected sore, deep ulcer or other infected rash   Negative: [1] Small swelling or lump AND [2] unexplained AND [3] present > 1 week   Negative: [1] New-onset hernia suspected (reducible bulge in groin or abdomen; non-tender) AND [2] NO pain or vomiting    Protocols used: Skin Lump or Localized Swelling-A-AH

## 2025-03-03 ENCOUNTER — TELEPHONE (OUTPATIENT)
Dept: FAMILY MEDICINE | Facility: CLINIC | Age: 65
End: 2025-03-03
Payer: MEDICAID

## 2025-03-03 DIAGNOSIS — M54.9 CHRONIC NECK AND BACK PAIN: ICD-10-CM

## 2025-03-03 DIAGNOSIS — M54.2 CHRONIC NECK AND BACK PAIN: ICD-10-CM

## 2025-03-03 DIAGNOSIS — F41.9 ANXIETY DISORDER, UNSPECIFIED TYPE: ICD-10-CM

## 2025-03-03 DIAGNOSIS — G89.29 CHRONIC NECK AND BACK PAIN: ICD-10-CM

## 2025-03-03 DIAGNOSIS — I10 PRIMARY HYPERTENSION: ICD-10-CM

## 2025-03-03 RX ORDER — AMLODIPINE BESYLATE 10 MG/1
10 TABLET ORAL DAILY
Qty: 90 TABLET | Refills: 3 | Status: SHIPPED | OUTPATIENT
Start: 2025-03-03 | End: 2025-06-01

## 2025-03-03 RX ORDER — DULOXETIN HYDROCHLORIDE 30 MG/1
30 CAPSULE, DELAYED RELEASE ORAL DAILY
Qty: 90 CAPSULE | Refills: 3 | Status: SHIPPED | OUTPATIENT
Start: 2025-03-03 | End: 2026-03-03

## 2025-03-03 NOTE — TELEPHONE ENCOUNTER
1. Are there any outstanding tasks in the patient's chart? (Ex. Labs, MMG)?  Labs   2. Do we have any outstanding/Pending referrals?  Derm - unabel to contact  3. Has the patient been seen in an ER, Urgent Care or been admitted to the hospital since last office visit with our office?  No   4. Has the patient seen any other health care provider (doctors) since last visit?  No   5. Has the patient had any blood work or x-rays done since last visit?  No

## 2025-03-05 ENCOUNTER — TELEPHONE (OUTPATIENT)
Dept: FAMILY MEDICINE | Facility: CLINIC | Age: 65
End: 2025-03-05
Payer: MEDICAID

## 2025-03-05 NOTE — TELEPHONE ENCOUNTER
----- Message from Lizzette sent at 3/5/2025 11:16 AM CST -----  .Who Called: Jo Ann Mckenna order is the patient requesting: has a lump on head  forehead When does the expect the orders to be performed? [facility] Preferred Method of Contact: Phone CallPatient's Preferred Phone Number on File: [phone] Best Call Back Number, if different:Additional Information:

## 2025-03-06 ENCOUNTER — HOSPITAL ENCOUNTER (EMERGENCY)
Facility: HOSPITAL | Age: 65
Discharge: HOME | End: 2025-03-06
Attending: STUDENT IN AN ORGANIZED HEALTH CARE EDUCATION/TRAINING PROGRAM
Payer: MEDICAID

## 2025-03-06 ENCOUNTER — RESULTS FOLLOW-UP (OUTPATIENT)
Dept: FAMILY MEDICINE | Facility: CLINIC | Age: 65
End: 2025-03-06

## 2025-03-06 VITALS
OXYGEN SATURATION: 96 % | WEIGHT: 146.13 LBS | DIASTOLIC BLOOD PRESSURE: 77 MMHG | SYSTOLIC BLOOD PRESSURE: 156 MMHG | TEMPERATURE: 98 F | HEART RATE: 66 BPM | BODY MASS INDEX: 22.88 KG/M2 | RESPIRATION RATE: 20 BRPM

## 2025-03-06 DIAGNOSIS — G43.009 MIGRAINE WITHOUT AURA AND WITHOUT STATUS MIGRAINOSUS, NOT INTRACTABLE: Primary | ICD-10-CM

## 2025-03-06 PROCEDURE — 99284 EMERGENCY DEPT VISIT MOD MDM: CPT | Mod: 25

## 2025-03-06 NOTE — ED PROVIDER NOTES
Encounter Date: 3/6/2025       History     Chief Complaint   Patient presents with    left forehead pain with a bump     She fell off the toilet at night after falling asleep months ago/ she has pain and a lump still so wants to be checked out/ her pain is always in the same spot that she hit months ago     HPI  Patient is a 64-year-old female past medical history of COPD, hypertension, CVA, anxiety, who presents to the ER complaining of left forehead pain.  Patient states that she had fallen off a toilet several months ago and since then has been having intermittent headaches noted to forehead.  She denies any associated nausea, vomiting.  She denies any acute vision changes or sensory changes.  Denies any weakness.  Patient notes that she has concern of underlying skull fracture.  Review of patient's allergies indicates:   Allergen Reactions    Nsaids (non-steroidal anti-inflammatory drug) Swelling    Cortisone Other (See Comments)    Mold     Naproxen     Pollen extracts     Prednisone Other (See Comments)     Past Medical History:   Diagnosis Date    Allergies     Anemia, unspecified     Anxiety disorder, unspecified     Arthritis     Carpal tunnel syndrome 02/07/2025    Chronic neck and back pain     COPD (chronic obstructive pulmonary disease)     Hepatitis B virus infection 02/07/2025    HTN (hypertension)     Meningitis spinal     Meningitis spinal     Mixed hyperlipidemia     Stroke      Past Surgical History:   Procedure Laterality Date    CHOLECYSTECTOMY      GANGLION CYST EXCISION      R wrist    HERNIA REPAIR      SPINE SURGERY      TUMOR REMOVAL       Family History   Problem Relation Name Age of Onset    Heart disease Mother      Vision loss Mother      Early death Mother      Cancer Mother      Stroke Mother      Diabetes Mother      Arthritis Mother      Early death Father      Arthritis Father      Alcohol abuse Father      Early death Sister      Cancer Sister      COPD Sister      Depression  Sister      Drug abuse Sister      Mental illness Sister      Early death Brother       Social History[1]  Review of Systems   Constitutional:  Negative for fever.   HENT:  Negative for sore throat.    Respiratory:  Negative for shortness of breath.    Cardiovascular:  Negative for chest pain.   Gastrointestinal:  Negative for nausea.   Genitourinary:  Negative for dysuria.   Musculoskeletal:  Negative for back pain.   Skin:  Negative for rash.   Neurological:  Positive for headaches. Negative for weakness.   Hematological:  Does not bruise/bleed easily.   All other systems reviewed and are negative.      Physical Exam     Initial Vitals [03/06/25 0819]   BP Pulse Resp Temp SpO2   (!) 156/77 66 20 98.3 °F (36.8 °C) 96 %      MAP       --         Physical Exam    Nursing note and vitals reviewed.  Constitutional: Vital signs are normal. She appears well-developed and well-nourished. She is not diaphoretic. She is active.  Non-toxic appearance. She does not appear ill. No distress.   HENT:   Head: Normocephalic and atraumatic.   Eyes: Conjunctivae are normal. Pupils are equal, round, and reactive to light. Right conjunctiva is not injected. Left conjunctiva is not injected.   Neck: Trachea normal. Neck supple.   Normal range of motion.   Full passive range of motion without pain.     Cardiovascular:  Normal rate, regular rhythm, S1 normal, S2 normal, intact distal pulses and normal pulses.           Pulmonary/Chest: Breath sounds normal. No respiratory distress. She has no wheezes.   Abdominal: Abdomen is soft. Bowel sounds are normal. There is no abdominal tenderness.   Musculoskeletal:         General: No tenderness or edema. Normal range of motion.      Cervical back: Full passive range of motion without pain, normal range of motion and neck supple. No rigidity.      Right lower leg: No swelling. No edema.      Left lower leg: No swelling. No edema.     Neurological: She is alert and oriented to person, place, and  time. She has normal strength. No cranial nerve deficit or sensory deficit. GCS score is 15. GCS eye subscore is 4. GCS verbal subscore is 5. GCS motor subscore is 6.   Skin: Skin is warm and dry. Capillary refill takes less than 2 seconds.         ED Course   Procedures  Labs Reviewed - No data to display       Imaging Results              CT Head Without Contrast (Final result)  Result time 03/06/25 09:00:23      Final result by Johnathan Mina MD (03/06/25 09:00:23)                   Impression:      No acute intracranial process identified.      Electronically signed by: Johnathan Mina  Date:    03/06/2025  Time:    09:00               Narrative:    EXAMINATION:  CT HEAD WITHOUT CONTRAST    CLINICAL HISTORY:  Patient is complaining intermittent headaches left forehead ongoing for the past several months after a fall.;    TECHNIQUE:  CT images of the head without IV contrast. Axial, coronal and sagittal images reviewed. Dose length product 1243 mGycm. Automatic exposure control, adjustment of mA/kV or iterative reconstruction technique used to limit radiation dose.    COMPARISON:  CT 10/11/2023    FINDINGS:  Extra-axial spaces/ventricular system: Normal for age.    Intracranial hemorrhage: None identified.    Cerebral parenchyma: No acute large vessel territory infarct or mass effect identified. Left posterior occipital and temporal lobe encephalomalacia from remote infarct.  Mild chronic microvascular ischemic changes in the supratentorial white matter.    Vascular system: No hyperdense vessel appreciated.    Cerebellum: Normal.    Sella: Normal.    Included paranasal sinuses and mastoid air cells: Well-aerated.    Visualized orbits: Normal.    Scalp/Calvarium: No depressed skull fracture.                                       Medications - No data to display  Medical Decision Making  Patient is a 64-year-old female past medical history of COPD, hypertension, CVA, anxiety, who presents to the ER complaining of  left forehead pain.     Differential diagnosis includes but not limited to: Concussion, skull fracture, intracranial hemorrhage, subdural hematoma, contusion     Patient's exam on arrival was grossly unremarkable.  There was a small amount of swelling noted to the left forehead however no other gross skull abnormalities on my exam.  Neurological exam is within normal limits.  Secondary to patient's complaints of headache, fall, CT head obtained.  Negative for any acute intracranial abnormality.  Will discharge at this time, she is currently asymptomatic.  She has been counseled to follow with the PCP in next 2-3 days.  Strict return precautions given symptoms worsen, change, she has any other additional concerns to return to ER for further care and evaluation.  She is amenable to plan as noted.      Frank Davis M.D.  Emergency Medicine        Amount and/or Complexity of Data Reviewed  Radiology: ordered.                                      Clinical Impression:  Final diagnoses:  [G43.009] Migraine without aura and without status migrainosus, not intractable (Primary)          ED Disposition Condition    Discharge Stable          ED Prescriptions    None       Follow-up Information       Follow up With Specialties Details Why Contact Info    Jody Torrez MD Family Medicine Schedule an appointment as soon as possible for a visit in 2 days For follow up [address] [place] [id number] [phone]      [facility] - Emergency Dept Emergency Medicine  If symptoms worsen [address] [facility] [address] [phone]                 [1]   Social History  Tobacco Use    Smoking status: Every Day     Current packs/day: 0.50     Average packs/day: 0.5 packs/day for 50.2 years (25.1 ttl pk-yrs)     Types: Cigarettes     Start date: 1975    Smokeless tobacco: Never    Tobacco comments:     Vape currently with nicotine   Substance Use Topics    Alcohol use: Never    Drug use: Yes      Types: Marijuana        Frank Davis MD  03/06/25 0919

## 2025-03-10 ENCOUNTER — RESULTS FOLLOW-UP (OUTPATIENT)
Dept: FAMILY MEDICINE | Facility: CLINIC | Age: 65
End: 2025-03-10

## 2025-03-10 ENCOUNTER — OFFICE VISIT (OUTPATIENT)
Dept: FAMILY MEDICINE | Facility: CLINIC | Age: 65
End: 2025-03-10
Payer: MEDICAID

## 2025-03-10 VITALS
TEMPERATURE: 98 F | RESPIRATION RATE: 15 BRPM | SYSTOLIC BLOOD PRESSURE: 158 MMHG | HEART RATE: 62 BPM | BODY MASS INDEX: 22.9 KG/M2 | DIASTOLIC BLOOD PRESSURE: 82 MMHG | WEIGHT: 146.19 LBS

## 2025-03-10 DIAGNOSIS — I10 PRIMARY HYPERTENSION: Primary | ICD-10-CM

## 2025-03-10 DIAGNOSIS — E78.2 MIXED HYPERLIPIDEMIA: ICD-10-CM

## 2025-03-10 DIAGNOSIS — G89.29 CHRONIC NECK AND BACK PAIN: ICD-10-CM

## 2025-03-10 DIAGNOSIS — R39.11 URINARY HESITANCY: ICD-10-CM

## 2025-03-10 DIAGNOSIS — F41.9 ANXIETY DISORDER, UNSPECIFIED TYPE: ICD-10-CM

## 2025-03-10 DIAGNOSIS — M54.2 CHRONIC NECK AND BACK PAIN: ICD-10-CM

## 2025-03-10 DIAGNOSIS — M54.9 CHRONIC NECK AND BACK PAIN: ICD-10-CM

## 2025-03-10 LAB
BACTERIA #/AREA URNS AUTO: ABNORMAL /HPF
BILIRUB SERPL-MCNC: NORMAL MG/DL
BILIRUB UR QL STRIP.AUTO: NEGATIVE
BLOOD URINE, POC: NORMAL
CAOX CRY UR QL COMP ASSIST: ABNORMAL
CLARITY UR: CLEAR
CLARITY, POC UA: NORMAL
COLOR UR AUTO: YELLOW
COLOR, POC UA: NORMAL
GLUCOSE UR QL STRIP: 100
GLUCOSE UR QL STRIP: NORMAL
HGB UR QL STRIP: NEGATIVE
KETONES UR QL STRIP: NEGATIVE
KETONES UR QL STRIP: NORMAL
LEUKOCYTE ESTERASE UR QL STRIP: ABNORMAL
LEUKOCYTE ESTERASE URINE, POC: NORMAL
NITRITE UR QL STRIP: NEGATIVE
NITRITE, POC UA: NORMAL
PH UR STRIP: 5.5 [PH]
PH, POC UA: 5
PROT UR QL STRIP: ABNORMAL
PROTEIN, POC: NORMAL
RBC #/AREA URNS AUTO: ABNORMAL /HPF
SP GR UR STRIP.AUTO: 1.02 (ref 1–1.03)
SPECIFIC GRAVITY, POC UA: 1.01
SQUAMOUS #/AREA URNS AUTO: ABNORMAL /HPF
UROBILINOGEN UR STRIP-ACNC: 1
UROBILINOGEN, POC UA: 0.2
WBC #/AREA URNS AUTO: ABNORMAL /HPF

## 2025-03-10 PROCEDURE — 81002 URINALYSIS NONAUTO W/O SCOPE: CPT | Mod: ,,,

## 2025-03-10 PROCEDURE — 3044F HG A1C LEVEL LT 7.0%: CPT | Mod: CPTII,,,

## 2025-03-10 PROCEDURE — 81003 URINALYSIS AUTO W/O SCOPE: CPT

## 2025-03-10 PROCEDURE — 3008F BODY MASS INDEX DOCD: CPT | Mod: CPTII,,,

## 2025-03-10 PROCEDURE — 87086 URINE CULTURE/COLONY COUNT: CPT

## 2025-03-10 PROCEDURE — 1160F RVW MEDS BY RX/DR IN RCRD: CPT | Mod: CPTII,,,

## 2025-03-10 PROCEDURE — 99214 OFFICE O/P EST MOD 30 MIN: CPT | Mod: ,,,

## 2025-03-10 PROCEDURE — 1159F MED LIST DOCD IN RCRD: CPT | Mod: CPTII,,,

## 2025-03-10 PROCEDURE — 3079F DIAST BP 80-89 MM HG: CPT | Mod: CPTII,,,

## 2025-03-10 PROCEDURE — 3077F SYST BP >= 140 MM HG: CPT | Mod: CPTII,,,

## 2025-03-10 RX ORDER — FLUTICASONE PROPIONATE 50 MCG
1 SPRAY, SUSPENSION (ML) NASAL DAILY
Qty: 15.8 ML | Refills: 0 | Status: SHIPPED | OUTPATIENT
Start: 2025-03-10

## 2025-03-10 RX ORDER — DULOXETIN HYDROCHLORIDE 30 MG/1
30 CAPSULE, DELAYED RELEASE ORAL 2 TIMES DAILY
Qty: 60 CAPSULE | Refills: 11 | Status: SHIPPED | OUTPATIENT
Start: 2025-03-10 | End: 2026-03-10

## 2025-03-10 RX ORDER — HYDROCHLOROTHIAZIDE 12.5 MG/1
12.5 TABLET ORAL DAILY
Qty: 30 TABLET | Refills: 11 | Status: SHIPPED | OUTPATIENT
Start: 2025-03-10 | End: 2026-03-10

## 2025-03-10 RX ORDER — METHOCARBAMOL 750 MG/1
750 TABLET, FILM COATED ORAL 4 TIMES DAILY
Qty: 40 TABLET | Refills: 0 | Status: SHIPPED | OUTPATIENT
Start: 2025-03-10 | End: 2025-03-20

## 2025-03-10 RX ORDER — DULOXETIN HYDROCHLORIDE 30 MG/1
30 CAPSULE, DELAYED RELEASE ORAL DAILY
Qty: 30 CAPSULE | Refills: 11 | Status: SHIPPED | OUTPATIENT
Start: 2025-03-10 | End: 2025-03-10

## 2025-03-10 RX ORDER — GABAPENTIN 600 MG/1
600 TABLET ORAL 3 TIMES DAILY
Qty: 90 TABLET | Refills: 0 | Status: SHIPPED | OUTPATIENT
Start: 2025-03-10

## 2025-03-10 NOTE — ASSESSMENT & PLAN NOTE
Refill of gabapentin 600 mg daily.  We will add on Robaxin due to recent muscle strain.  We will also increase Cymbalta to 30 mg b.i.d..

## 2025-03-10 NOTE — ASSESSMENT & PLAN NOTE
Consistently elevated at home and in clinic today.  We will add hydrochlorothiazide 12.5 mg daily.  Continue above med    AHA/ACC goal <130/80. JNC8 goal <140/90 (all ages if DM or CKD OR <60), <150/90 (>60 w/o CKD or DM)  Low Sodium Diet (DASH Diet - Less than 2 grams of sodium per day).  Monitor blood pressure daily and log. Report consistent numbers greater than 140/90.  Maintain healthy weight with goal BMI <30. Exercise 30 minutes per day, 5 days per week.  Vaping cessation encouraged

## 2025-03-10 NOTE — ASSESSMENT & PLAN NOTE
Well-controlled and at goal.  Continue Lipitor 40 mg HS    Lab Results   Component Value Date    LDL 68.00 03/06/2025    TRIG 93 03/06/2025    HDL 47 03/06/2025    TOTALCHOLEST 3 03/06/2025     Educated on importance of dietary modifications. Follow a low cholesterol, low saturated fat diet with less that 200mg of cholesterol a day.  Avoid fried foods and high saturated fats (high saturated fats less than 7% of calories).  Increase dietary fiber.  Regular exercise can reduce LDL and raise HDL. Stressed importance of physical activity 5 times per week for 30 minutes per day.

## 2025-03-10 NOTE — PROGRESS NOTES
[facility]  Jody Torrez MD    Patient ID: [id number]     Chief Complaint: Follow-up (1 mth)      HPI:     Jo Ann Swan is a 64 y.o. female with a history of PCA stroke 2023, cervical spondylosis, anxiety, hypertension, COPD here today for follow up of chronic conditions.    Patient reports improvement in her anxiety and chronic pain with Cymbalta 30 mg daily.  She is also requesting a refill of her gabapentin.  Patient has been out of her Valium, prescribed by previous PCP.  UDS positive for cannabinoids.    Blood pressure 150s over 80s in clinic today.  She has been compliant with amlodipine 10 mg daily.    She reports pain in her right buttock radiating down the back of her leg onset yesterday.  She was working in her garden yesterday.    Past Medical History:   Diagnosis Date    Allergies     Anemia, unspecified     Anxiety disorder, unspecified     Arthritis     Carpal tunnel syndrome 02/07/2025    Chronic neck and back pain     COPD (chronic obstructive pulmonary disease)     Hepatitis B virus infection 02/07/2025    HTN (hypertension)     Meningitis spinal     Meningitis spinal     Mixed hyperlipidemia     Stroke         Past Surgical History:   Procedure Laterality Date    CHOLECYSTECTOMY      GANGLION CYST EXCISION      R wrist    HERNIA REPAIR      SPINE SURGERY      TUMOR REMOVAL          Social History     Tobacco Use    Smoking status: Every Day     Current packs/day: 0.50     Average packs/day: 0.5 packs/day for 50.2 years (25.1 ttl pk-yrs)     Types: Cigarettes     Start date: 1975    Smokeless tobacco: Never    Tobacco comments:     Vape currently with nicotine   Substance and Sexual Activity    Alcohol use: Never    Drug use: Yes     Types: Marijuana    Sexual activity: Not Currently        Current Outpatient Medications   Medication Instructions    amLODIPine (NORVASC) 10 mg, Oral, Daily    atorvastatin (LIPITOR) 40 mg, Oral, Nightly    clopidogreL (PLAVIX) 75 mg, Oral,  Daily    dextromethorphan-guaiFENesin  mg (MUCINEX DM)  mg per 12 hr tablet 1 tablet, 2 times daily PRN    diazePAM (VALIUM) 10-15 mg    DULoxetine (CYMBALTA) 30 mg, Oral, 2 times daily    fluticasone propionate (FLONASE) 50 mcg, Each Nostril, Daily    gabapentin (NEURONTIN) 600 mg, Oral, 3 times daily    hydroCHLOROthiazide 12.5 mg, Oral, Daily    HYDROcodone-acetaminophen (NORCO) 7.5-325 mg per tablet 1 tablet, 2 times daily    methocarbamoL (ROBAXIN) 750 mg, Oral, 4 times daily    nicotine (NICODERM CQ) 14 mg/24 hr 1 patch, Transdermal, Daily       Review of patient's allergies indicates:   Allergen Reactions    Nsaids (non-steroidal anti-inflammatory drug) Swelling    Cortisone Other (See Comments)    Mold     Naproxen     Pollen extracts     Prednisone Other (See Comments)        Patient Care Team:  Jody Torrez MD as PCP - General (Family Medicine)  Karissa Gimenez FNP as Nurse Practitioner (Neurology)  Cassy Alex MD as Consulting Physician (Ophthalmology)     Subjective:     Review of Systems    12 point review of systems conducted, negative except as stated in the history of present illness. See HPI for details.    Objective:     Visit Vitals  BP (!) 158/82   Pulse 62   Temp 98 °F (36.7 °C) (Oral)   Resp 15   Wt 66.3 kg (146 lb 3.2 oz)   BMI 22.90 kg/m²       Physical Exam  Vitals and nursing note reviewed.   Constitutional:       General: She is not in acute distress.     Appearance: She is not ill-appearing.   HENT:      Head: Normocephalic and atraumatic.   Eyes:      General: No scleral icterus.     Extraocular Movements: Extraocular movements intact.      Conjunctiva/sclera: Conjunctivae normal.   Cardiovascular:      Rate and Rhythm: Normal rate and regular rhythm.      Heart sounds: No murmur heard.     No friction rub. No gallop.   Pulmonary:      Effort: Pulmonary effort is normal. No respiratory distress.      Breath sounds: Normal breath sounds. No wheezing, rhonchi or rales.    Musculoskeletal:         General: Normal range of motion.      Right lower leg: No edema.      Left lower leg: No edema.   Skin:     General: Skin is warm and dry.   Neurological:      General: No focal deficit present.      Mental Status: She is alert.   Psychiatric:         Mood and Affect: Mood normal.         Labs Reviewed:     Chemistry:  Lab Results   Component Value Date     03/06/2025    K 4.4 03/06/2025    BUN 11.6 03/06/2025    CREATININE 0.79 03/06/2025    EGFRNORACEVR >60 03/06/2025    GLUCOSE 113 03/06/2025    CALCIUM 9.8 03/06/2025    ALKPHOS 98 03/06/2025    LABPROT 7.5 03/06/2025    ALBUMIN 4.1 03/06/2025    BILIDIR 0.00 05/24/2019    IBILI 0.30 05/24/2019    AST 17 03/06/2025    ALT 19 03/06/2025    MG 2.00 10/12/2023    PHOS 3.8 10/12/2023    TSH 1.419 03/06/2025        Lab Results   Component Value Date    HGBA1C 5.2 03/06/2025        Hematology:  Lab Results   Component Value Date    WBC 10.69 03/06/2025    HGB 13.5 03/06/2025    HCT 41.3 03/06/2025     03/06/2025       Lipid Panel:  Lab Results   Component Value Date    CHOL 134 03/06/2025    HDL 47 03/06/2025    LDL 68.00 03/06/2025    TRIG 93 03/06/2025    TOTALCHOLEST 3 03/06/2025        Urine:  Lab Results   Component Value Date    APPEARANCEUA Clear 06/22/2024    SGUA 1.020 06/22/2024    PROTEINUA Negative 06/22/2024    KETONESUA Negative 06/22/2024    LEUKOCYTESUR Negative 06/22/2024    RBCUA 0-5 06/22/2024    WBCUA 0-5 06/22/2024    BACTERIA None Seen 06/22/2024    SQEPUA Trace 06/22/2024        Assessment:       ICD-10-CM ICD-9-CM   1. Primary hypertension  I10 401.9   2. Mixed hyperlipidemia  E78.2 272.2   3. Anxiety disorder, unspecified type  F41.9 300.00   4. Chronic neck and back pain  M54.2 723.1    M54.9 724.5    G89.29 338.29   5. Urinary hesitancy  R39.11 788.64        Plan:     1. Primary hypertension  Overview:  Amlodipine 10 mg daily  Hydrochlorothiazide 12.5 mg daily    Assessment & Plan:  Consistently  elevated at home and in clinic today.  We will add hydrochlorothiazide 12.5 mg daily.  Continue above med    AHA/ACC goal <130/80. JNC8 goal <140/90 (all ages if DM or CKD OR <60), <150/90 (>60 w/o CKD or DM)  Low Sodium Diet (DASH Diet - Less than 2 grams of sodium per day).  Monitor blood pressure daily and log. Report consistent numbers greater than 140/90.  Maintain healthy weight with goal BMI <30. Exercise 30 minutes per day, 5 days per week.  Vaping cessation encouraged      2. Mixed hyperlipidemia  Overview:  Lipitor 40 mg HS    Assessment & Plan:  Well-controlled and at goal.  Continue Lipitor 40 mg HS    Lab Results   Component Value Date    LDL 68.00 03/06/2025    TRIG 93 03/06/2025    HDL 47 03/06/2025    TOTALCHOLEST 3 03/06/2025     Educated on importance of dietary modifications. Follow a low cholesterol, low saturated fat diet with less that 200mg of cholesterol a day.  Avoid fried foods and high saturated fats (high saturated fats less than 7% of calories).  Increase dietary fiber.  Regular exercise can reduce LDL and raise HDL. Stressed importance of physical activity 5 times per week for 30 minutes per day.         3. Anxiety disorder, unspecified type  Assessment & Plan:  Patient understands that we will not be continuing volume due to positive UDS for cannabinoids.  Cymbalta has been helping with her anxiety.  We will increase to 30 mg b.i.d..    Orders:  -     Discontinue: DULoxetine (CYMBALTA) 30 MG capsule; Take 1 capsule (30 mg total) by mouth once daily.  Dispense: 30 capsule; Refill: 11  -     DULoxetine (CYMBALTA) 30 MG capsule; Take 1 capsule (30 mg total) by mouth 2 (two) times daily.  Dispense: 60 capsule; Refill: 11    4. Chronic neck and back pain  Assessment & Plan:  Refill of gabapentin 600 mg daily.  We will add on Robaxin due to recent muscle strain.  We will also increase Cymbalta to 30 mg b.i.d..    Orders:  -     Discontinue: DULoxetine (CYMBALTA) 30 MG capsule; Take 1 capsule  (30 mg total) by mouth once daily.  Dispense: 30 capsule; Refill: 11  -     methocarbamoL (ROBAXIN) 750 MG Tab; Take 1 tablet (750 mg total) by mouth 4 (four) times daily. for 10 days  Dispense: 40 tablet; Refill: 0  -     gabapentin (NEURONTIN) 600 MG tablet; Take 1 tablet (600 mg total) by mouth 3 (three) times daily.  Dispense: 90 tablet; Refill: 0  -     DULoxetine (CYMBALTA) 30 MG capsule; Take 1 capsule (30 mg total) by mouth 2 (two) times daily.  Dispense: 60 capsule; Refill: 11    5. Urinary hesitancy  -     Urinalysis  -     Urine Culture High Risk  -     POCT URINE DIPSTICK WITHOUT MICROSCOPE    Other orders  -     hydroCHLOROthiazide 12.5 MG Tab; Take 1 tablet (12.5 mg total) by mouth once daily.  Dispense: 30 tablet; Refill: 11  -     fluticasone propionate (FLONASE) 50 mcg/actuation nasal spray; 1 spray (50 mcg total) by Each Nostril route once daily.  Dispense: 15.8 mL; Refill: 0         Follow up in about 3 months (around 6/10/2025). In addition to their scheduled follow up, the patient has also been instructed to follow up on as needed basis.     Future Appointments   Date Time Provider Department Center   3/13/2025  7:30 AM [id number]   4/10/2025 11:00 AM Carola Palafox, [facility] NEURO [place]    9/9/2025  1:20 PM PROVIDERS, HESHAM Torrez MD

## 2025-03-10 NOTE — ASSESSMENT & PLAN NOTE
Patient understands that we will not be continuing volume due to positive UDS for cannabinoids.  Cymbalta has been helping with her anxiety.  We will increase to 30 mg b.i.d..

## 2025-03-13 ENCOUNTER — HOSPITAL ENCOUNTER (OUTPATIENT)
Dept: RADIOLOGY | Facility: HOSPITAL | Age: 65
Discharge: HOME | End: 2025-03-13
Payer: MEDICAID

## 2025-03-13 DIAGNOSIS — Z12.31 BREAST CANCER SCREENING BY MAMMOGRAM: ICD-10-CM

## 2025-03-13 LAB — BACTERIA UR CULT: NORMAL

## 2025-03-13 PROCEDURE — 77067 SCR MAMMO BI INCL CAD: CPT | Mod: TC

## 2025-03-13 PROCEDURE — 77067 SCR MAMMO BI INCL CAD: CPT | Mod: 26,,, | Performed by: RADIOLOGY

## 2025-03-13 PROCEDURE — 77063 BREAST TOMOSYNTHESIS BI: CPT | Mod: 26,,, | Performed by: RADIOLOGY

## 2025-03-19 ENCOUNTER — TELEPHONE (OUTPATIENT)
Dept: FAMILY MEDICINE | Facility: CLINIC | Age: 65
End: 2025-03-19
Payer: MEDICAID

## 2025-03-19 NOTE — TELEPHONE ENCOUNTER
----- Message from Jody Torrez MD sent at 3/19/2025  3:24 PM CDT -----  Regarding: RE: advice  She has areas of both breasts that need more detailed imaging, which is why the diagnostic mammogram and ultrasounds were reordered.  She also has a lump in the right breast that needs to be looked at more closely.  This does not mean she has cancer.  The radiologist just needs some better images.  ----- Message -----  From: Fidel PapoSUSANA  Sent: 3/19/2025   1:38 PM CDT  To: Jody Torrez MD  Subject: FW: advice                                       I never formally got results on her to talk to her but I see you ordered diagnostic testing for the breast. What should I tell her?Asymmetry is of bilateral breasts is requiring further imaging?  ----- Message -----  From: Brittni Garcia  Sent: 3/19/2025   1:32 PM CDT  To: Shan Vera Staff  Subject: advice                                           Who Called: Jo Ann Marcano is requesting assistance/information from provider's office.Symptoms (please be specific): How long has patient had these symptoms:  List of preferred pharmacies on file (remove unneeded): [unfilled]If different, enter pharmacy into here including location and phone number: Preferred Method of Contact: Phone CallPatient's Preferred Phone Number on File: [phone] Best Call Back Number, if different:Additional Information: stated that she received a letter in the mail about her recent mammo. Stated that she needs further testing. Requested call back from the nurse for further testing. Please advise

## 2025-03-20 ENCOUNTER — TELEPHONE (OUTPATIENT)
Dept: RADIOLOGY | Facility: HOSPITAL | Age: 65
End: 2025-03-20
Payer: MEDICAID

## 2025-03-31 ENCOUNTER — HOSPITAL ENCOUNTER (OUTPATIENT)
Dept: RADIOLOGY | Facility: HOSPITAL | Age: 65
Discharge: HOME | End: 2025-03-31
Payer: MEDICAID

## 2025-03-31 DIAGNOSIS — R92.8 ABNORMAL SCREENING MAMMOGRAM: ICD-10-CM

## 2025-03-31 PROCEDURE — 77066 DX MAMMO INCL CAD BI: CPT | Mod: 26,,, | Performed by: RADIOLOGY

## 2025-03-31 PROCEDURE — 77062 BREAST TOMOSYNTHESIS BI: CPT | Mod: TC

## 2025-03-31 PROCEDURE — 76642 ULTRASOUND BREAST LIMITED: CPT | Mod: TC,RT

## 2025-03-31 PROCEDURE — 76642 ULTRASOUND BREAST LIMITED: CPT | Mod: 26,RT,, | Performed by: RADIOLOGY

## 2025-03-31 PROCEDURE — 77062 BREAST TOMOSYNTHESIS BI: CPT | Mod: 26,,, | Performed by: RADIOLOGY

## 2025-04-07 ENCOUNTER — TELEPHONE (OUTPATIENT)
Dept: NEUROLOGY | Facility: CLINIC | Age: 65
End: 2025-04-07
Payer: MEDICAID

## 2025-04-07 DIAGNOSIS — M54.9 CHRONIC NECK AND BACK PAIN: ICD-10-CM

## 2025-04-07 DIAGNOSIS — G89.29 CHRONIC NECK AND BACK PAIN: ICD-10-CM

## 2025-04-07 DIAGNOSIS — M54.2 CHRONIC NECK AND BACK PAIN: ICD-10-CM

## 2025-04-07 RX ORDER — GABAPENTIN 600 MG/1
600 TABLET ORAL 3 TIMES DAILY
Qty: 90 TABLET | Refills: 0 | Status: SHIPPED | OUTPATIENT
Start: 2025-04-07

## 2025-04-07 RX ORDER — FLUTICASONE PROPIONATE 50 MCG
1 SPRAY, SUSPENSION (ML) NASAL DAILY
Qty: 15.8 ML | Refills: 0 | Status: SHIPPED | OUTPATIENT
Start: 2025-04-07 | End: 2025-04-10 | Stop reason: SDUPTHER

## 2025-04-10 ENCOUNTER — OFFICE VISIT (OUTPATIENT)
Dept: NEUROLOGY | Facility: CLINIC | Age: 65
End: 2025-04-10
Payer: MEDICAID

## 2025-04-10 VITALS
WEIGHT: 159 LBS | TEMPERATURE: 98 F | RESPIRATION RATE: 16 BRPM | SYSTOLIC BLOOD PRESSURE: 116 MMHG | HEART RATE: 60 BPM | OXYGEN SATURATION: 96 % | BODY MASS INDEX: 24.96 KG/M2 | DIASTOLIC BLOOD PRESSURE: 67 MMHG | HEIGHT: 67 IN

## 2025-04-10 DIAGNOSIS — H53.461 HEMIANOPIA, HOMONYMOUS, RIGHT: ICD-10-CM

## 2025-04-10 DIAGNOSIS — G44.86 CERVICOGENIC HEADACHE: Chronic | ICD-10-CM

## 2025-04-10 DIAGNOSIS — M54.2 CHRONIC NECK AND BACK PAIN: ICD-10-CM

## 2025-04-10 DIAGNOSIS — R40.0 DAYTIME SOMNOLENCE: ICD-10-CM

## 2025-04-10 DIAGNOSIS — R06.83 LOUD SNORING: ICD-10-CM

## 2025-04-10 DIAGNOSIS — M47.812 CERVICAL SPONDYLOSIS: ICD-10-CM

## 2025-04-10 DIAGNOSIS — M54.9 CHRONIC NECK AND BACK PAIN: ICD-10-CM

## 2025-04-10 DIAGNOSIS — G43.E11 CHRONIC MIGRAINE WITH AURA, INTRACTABLE, WITH STATUS MIGRAINOSUS: Primary | Chronic | ICD-10-CM

## 2025-04-10 DIAGNOSIS — I63.9 CEREBROVASCULAR ACCIDENT (CVA), UNSPECIFIED MECHANISM: Chronic | ICD-10-CM

## 2025-04-10 DIAGNOSIS — G89.29 CHRONIC NECK AND BACK PAIN: ICD-10-CM

## 2025-04-10 DIAGNOSIS — F41.9 ANXIETY DISORDER, UNSPECIFIED TYPE: Chronic | ICD-10-CM

## 2025-04-10 PROCEDURE — 99215 OFFICE O/P EST HI 40 MIN: CPT | Mod: PBBFAC | Performed by: NURSE PRACTITIONER

## 2025-04-10 RX ORDER — DULOXETIN HYDROCHLORIDE 30 MG/1
CAPSULE, DELAYED RELEASE ORAL
Qty: 90 CAPSULE | Refills: 4 | Status: SHIPPED | OUTPATIENT
Start: 2025-04-10

## 2025-04-10 RX ORDER — AMITRIPTYLINE HYDROCHLORIDE 25 MG/1
25 TABLET, FILM COATED ORAL
COMMUNITY
Start: 2025-04-07

## 2025-04-10 RX ORDER — TIZANIDINE 4 MG/1
4 TABLET ORAL 2 TIMES DAILY
Qty: 20 TABLET | Refills: 2 | Status: SHIPPED | OUTPATIENT
Start: 2025-04-10 | End: 2026-04-10

## 2025-04-10 RX ORDER — FLUTICASONE PROPIONATE 50 MCG
1 SPRAY, SUSPENSION (ML) NASAL DAILY
Qty: 15.8 ML | Refills: 0 | Status: SHIPPED | OUTPATIENT
Start: 2025-04-10

## 2025-04-10 NOTE — PROGRESS NOTES
"[facility] Neurology Initial Office Visit Note    Initial Visit Date: 4/10/2025  Last Visit Date:   Current Visit Date:  04/10/2025  Subjective:      Patient ID: Jo Ann Swan is a 64 y.o. female.    Chief Complaint: Stroke and Headache (Pt states she has headaches daily)    HPI  This is a 65 YO  F w/a medical hx of HTN, hx of c-spine fusion, lumbar spondylosis, "spinal tumor removal L-spine late 80s", OA, abdominal hernia, anxiety, COPD, HLD, stress incontinence, chronic back pain with radiculopathy/opiate dependent, tobacco smoker who is referred for stroke and headache. Pt presented to the ED with complaint of right hand numbness, right visual field deficit and left-sided headache all started around 11:00 a.m. on 10/10/2023.  CT head + subacute ischemic infarct in the left PCA territory which was confirmed by MRI showing acute left PCA territory infarct without hemorrhage, CTA head and neck showed occluded proximal left PCA. Echo showed LVEF 55-60%, grade 1 diastolic dysfunction, negative bubble study. Her labs notable for urinalysis consistent with UTI with significantly elevated WBCs.  Evaluated by Neurology, initiated on aspirin and Plavix.  Dx w/atheroembolic PCA stroke. Continued w/blurred vision.  Recommend follow-up with Ophthalmology as an outpatient.  Last seen by L[facility] Gim[facility]18/[facility]. Admits to snoring, daytime somnolence, naps during the day, occasionally awakens gasping for air.     Age of Onset : 1987     Headache Description: located between neck and head and may radiate to either side of head or both sides, described as shooting/stabbing, occasional "light" around people or objects, photophobia/phonophobia, denies nausea, affects ADLs    Frequency: 30 headache days per month.     Provocation Factors: stress, lack of sleep    Risk Factors  - Family history of headache disorder: Yes mother  - History of focal CNS lesions: No  - History of CNS infections: Yes " meningitis x 2 1989 (comatose 2  weeks and 2013)  - History head trauma: Yes in gymnastics as child  - History of underlying mood disorder: Yes anxiety  - History of sleep disorder: Yes insomnia  - Recreational drug use: yes; marijuana  - Tobacco use: Yes vaping  - Alcohol use: No  - Weight fluctuation: No  - Isotretinoin or Tetracycline use:  Unknown  - Family planning and contraceptive use: Not Applicable    Medications:     Current Prophylactic  Gabapentin 600mg PO TID  Duloxetine 30mg PO BID  Amlodipine 10mg PO Qday  HCTZ 12.5mg PO Qday     Current Abortive  Tylenol - ineffective    Prior Prophylactic  none    Prior Abortive  Sumatriptan 100mg PO BID PRN (10/7/2024 - ?) effective but contraindicated in stroke  Tizanidine 4mg PO TID PRN (1/18/2024 - 3/10/2025) effective but out  Devices:     - VNS:  - TNS  - TMS:     Procedures:     - Botox:  - PSG block:   - Occipital nerve block:     Labs:     Results for orders placed or performed in visit on 03/10/25   Urine Culture High Risk    Collection Time: 03/10/25  8:54 AM    Specimen: Urine   Result Value Ref Range    Urine Culture No Significant Growth    Urinalysis    Collection Time: 03/10/25  8:54 AM   Result Value Ref Range    Color, UA Yellow Yellow, Light-Yellow, Dark Yellow, Antoinette, Straw    Appearance, UA Clear Clear    Specific Gravity, UA 1.025 1.005 - 1.030    pH, UA 5.5 5.0 - 8.5    Protein, UA Trace (A) Negative    Glucose,  (A) Negative, Normal    Ketones, UA Negative Negative    Blood, UA Negative Negative    Bilirubin, UA Negative Negative    Urobilinogen, UA 1.0 0.2, 1.0, Normal    Nitrites, UA Negative Negative    Leukocyte Esterase, UA Trace (A) Negative   Urinalysis, Microscopic    Collection Time: 03/10/25  8:54 AM   Result Value Ref Range    Bacteria, UA None Seen None Seen, Rare, Occasional /HPF    Calcium Oxalate Crystals, UA Rare (A) None Seen    RBC, UA None Seen None Seen, 0-2, 3-5, 0-5 /HPF    WBC, UA None Seen None Seen, 0-2, 3-5,  0-5 /HPF    Squamous Epithelial Cells, UA Rare None Seen, Rare, Occasional, Occ /HPF   POCT URINE DIPSTICK WITHOUT MICROSCOPE    Collection Time: 03/10/25  9:00 AM   Result Value Ref Range    Glucose, UA neg     Bilirubin, POC neg     Ketones, UA neg     Spec Grav UA 1.015     Blood, UA neg     pH, UA 5.0     Protein, POC trace     Urobilinogen, UA 0.2     Nitrite, UA neg     WBC, UA trace     Color, UA Dark Yellow     Clarity, UA Slightly Cloudy        Studies:     - Echo 10/11/2023: L EF 55-60%; bubble negative  - CUS 10/11/2023: bilat carotid <50% stenosis  - MRI Brain w/out Ezekiel 10/11/2023: acute L PCA territory infarct; mild chronic microvascular ischemic changes  - CTA head/neck 10/11/2023: occluded proximal L PCA  - NCHCT 10/11/2023: subacute ischemic changes in L PCA territory  - Lumbar Puncture:  - MRA Head w/o Ezekiel:   - MRV Head w/o Ezekiel:   - Cerebral angiogram 1/4/2024: moderate stenosis of superior M2 branch of the L MCA, not flow limiting    Review of Systems    As per HPI. All other systems negative    Physical Exams:     Vitals:    04/10/25 1059   BP: 116/67   Pulse: 60   Resp: 16   Temp: 98.2 °F (36.8 °C)     Physical Exam  Constitutional:       Appearance: Normal appearance. She is normal weight.   HENT:      Head: Normocephalic and atraumatic.      Right Ear: External ear normal.      Left Ear: External ear normal.      Nose: Nose normal.      Mouth/Throat:      Mouth: Mucous membranes are moist.      Pharynx: Oropharynx is clear.   Eyes:      Conjunctiva/sclera: Conjunctivae normal.   Cardiovascular:      Rate and Rhythm: Normal rate and regular rhythm.      Pulses: Normal pulses.      Heart sounds: Normal heart sounds.   Pulmonary:      Effort: Pulmonary effort is normal.      Breath sounds: Normal breath sounds.   Abdominal:      General: Abdomen is flat. There is no distension.      Tenderness: There is no guarding.   Musculoskeletal:         General: Normal range of motion.      Cervical back:  "Normal range of motion.   Skin:     General: Skin is warm and dry.   Neurological:      Mental Status: She is alert.     Comprehensive Neurological Exam:  Mental Status: Alert Oriented to Self, Date, and Place. Naming, repetition, reading, and writing wnl. Comprehension wnl. No dysarthria.   CN II - XII: KATHRYN, No APD,  VA grossly intact to finger counting at 6 ft, VFFC w/exception R homonymous hemianopia, No ptosis OU, EOMI without nystagmus, LT/Temp symmetric in CN V1-3 distribution, Hearing grossly intact, Face Symmetric, Tongue and Uvula midline, Trapezius symmetric bilateral.   Motor: tone and bulk wnl throughout, no abnormal involuntary or voluntary movements, 5/5 to confrontation, Fine finger movements wnl b/l, No pronator drift.   Sensory: LT, Proprioception, Vibration, PP, Temp symmetric w/exception of numbness to R thigh. No sensory simultagnosia.   Reflexes: 2+ throughout  Cerebellar: FNF wnl b/l  Romberg: sway back and forth, no fall  Gait: normal. Difficulty w/Heel Gait, Toe Gait, and Tandem Gait wnl. Bilat arm swing intact     Assessment:     This is 64 y.o. 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 Last seen by L[facility]Y Gimenez NP at [facility] 10/18/2024. Admits to snoring, daytime somnolence, naps during the day, occasionally awakens gasping for air. Symptoms indicative of cervicogenic HA with superimposing chronic migraine with aura, intractable with status. Utilizes TENS machine for neck pain. Tizanidine has been effective in past for neck pain and HA. Triptans contraindicated in Pt's with stroke.    1. Chronic migraine with aura, intractable, with status migrainosus  -     ubrogepant (UBRELVY) 100 mg tablet; Take 1 tablet (100 mg total) " by mouth 2 (two) times daily as needed for Migraine. If symptoms persist or return, may repeat dose after 2 hours. Maximum: 200 mg per 24 hours  Dispense: 16 tablet; Refill: 2    2. Cerebrovascular accident (CVA), unspecified mechanism  -     Ambulatory consult to Neurology    3. Cervicogenic headache    4. Chronic neck and back pain  -     tiZANidine (ZANAFLEX) 4 MG tablet; Take 1 tablet (4 mg total) by mouth 2 (two) times a day.  Dispense: 20 tablet; Refill: 2    5. Cervical spondylosis  -     Ambulatory consult to Neurology    6. Anxiety disorder, unspecified type  -     DULoxetine (CYMBALTA) 30 MG capsule; 1 capsule in AM and 2 caps at night  Dispense: 90 capsule; Refill: 4    7. Loud snoring    8. Daytime somnolence    9. Hemianopia, homonymous, right      Plan:     [] increase duloxetine to 30mg in AM and 60mg nightly as migraine preventative  [] start Ubrelvy 100mg PO BID PRN at onset of mgiraine as triptans are contraindicated in Pts w/stroke hx  [] order Home Sleep study  [] start tizanidine 4mg BID PRN neck pain  [] hold physical therapy for now; Pt not interested at this time    RTC 3 mths - TM okay    Headache education provided: good sleep hygiene and 7 hours of sleep per night, stress management, medication overuse education provided. Using more 3 OTC per week may worsen headaches, high intensity interval training has shown to reduce headache frequency. Low carb, high protein has shown to reduce headache frequency. Patient is instructed in keep headache diary.     Stroke Education Provided - including DASH diet, Blood Pressure Control < 130/90, HgA1c < 7.0%, LDL < 100, sleep apnea, and physical activity of at least 150 min per week    I have explained the treatment plan, diagnosis, and prognosis to patient. All questions are answered to the best of my knowledge.     Visit today is associated with current or anticipated ongoing medical care related to this patient's single serious condition/complex  condition as documented above.     Face to face time 60 minutes, including documentation, chart review, counseling, education, review of test results, relevant medical records, and coordination of care.       Carola Palafox, NP-C  General Neurology  04/10/2025

## 2025-04-14 DIAGNOSIS — E78.2 MIXED HYPERLIPIDEMIA: ICD-10-CM

## 2025-04-14 RX ORDER — ATORVASTATIN CALCIUM 40 MG/1
40 TABLET, FILM COATED ORAL NIGHTLY
Qty: 30 TABLET | Refills: 1 | Status: SHIPPED | OUTPATIENT
Start: 2025-04-14

## 2025-06-09 DIAGNOSIS — M54.2 CHRONIC NECK AND BACK PAIN: ICD-10-CM

## 2025-06-09 DIAGNOSIS — G89.29 CHRONIC NECK AND BACK PAIN: ICD-10-CM

## 2025-06-09 DIAGNOSIS — M54.9 CHRONIC NECK AND BACK PAIN: ICD-10-CM

## 2025-06-10 RX ORDER — GABAPENTIN 600 MG/1
600 TABLET ORAL 3 TIMES DAILY
Qty: 90 TABLET | Refills: 0 | Status: SHIPPED | OUTPATIENT
Start: 2025-06-10 | End: 2025-06-11 | Stop reason: SDUPTHER

## 2025-06-10 RX ORDER — FLUTICASONE PROPIONATE 50 MCG
1 SPRAY, SUSPENSION (ML) NASAL DAILY
Qty: 15.8 ML | Refills: 0 | Status: SHIPPED | OUTPATIENT
Start: 2025-06-10

## 2025-06-11 DIAGNOSIS — G89.29 CHRONIC NECK AND BACK PAIN: ICD-10-CM

## 2025-06-11 DIAGNOSIS — M54.2 CHRONIC NECK AND BACK PAIN: ICD-10-CM

## 2025-06-11 DIAGNOSIS — M54.9 CHRONIC NECK AND BACK PAIN: ICD-10-CM

## 2025-06-11 RX ORDER — GABAPENTIN 600 MG/1
600 TABLET ORAL 3 TIMES DAILY
Qty: 90 TABLET | Refills: 0 | Status: SHIPPED | OUTPATIENT
Start: 2025-06-11

## 2025-06-15 DIAGNOSIS — E78.2 MIXED HYPERLIPIDEMIA: ICD-10-CM

## 2025-06-16 DIAGNOSIS — M54.2 CHRONIC NECK AND BACK PAIN: ICD-10-CM

## 2025-06-16 DIAGNOSIS — M54.9 CHRONIC NECK AND BACK PAIN: ICD-10-CM

## 2025-06-16 DIAGNOSIS — G89.29 CHRONIC NECK AND BACK PAIN: ICD-10-CM

## 2025-06-16 RX ORDER — ATORVASTATIN CALCIUM 40 MG/1
40 TABLET, FILM COATED ORAL NIGHTLY
Qty: 30 TABLET | Refills: 1 | Status: SHIPPED | OUTPATIENT
Start: 2025-06-16

## 2025-06-16 RX ORDER — TIZANIDINE 4 MG/1
4 TABLET ORAL 2 TIMES DAILY
Qty: 20 TABLET | Refills: 2 | Status: SHIPPED | OUTPATIENT
Start: 2025-06-16

## 2025-07-07 ENCOUNTER — TELEPHONE (OUTPATIENT)
Dept: NEUROLOGY | Facility: CLINIC | Age: 65
End: 2025-07-07
Payer: MEDICAID

## 2025-07-09 DIAGNOSIS — M54.9 CHRONIC NECK AND BACK PAIN: ICD-10-CM

## 2025-07-09 DIAGNOSIS — G89.29 CHRONIC NECK AND BACK PAIN: ICD-10-CM

## 2025-07-09 DIAGNOSIS — I10 PRIMARY HYPERTENSION: ICD-10-CM

## 2025-07-09 DIAGNOSIS — M54.2 CHRONIC NECK AND BACK PAIN: ICD-10-CM

## 2025-07-10 ENCOUNTER — OFFICE VISIT (OUTPATIENT)
Dept: NEUROLOGY | Facility: CLINIC | Age: 65
End: 2025-07-10
Payer: MEDICAID

## 2025-07-10 VITALS
DIASTOLIC BLOOD PRESSURE: 77 MMHG | WEIGHT: 148.38 LBS | HEIGHT: 67 IN | TEMPERATURE: 98 F | SYSTOLIC BLOOD PRESSURE: 126 MMHG | HEART RATE: 61 BPM | BODY MASS INDEX: 23.29 KG/M2 | RESPIRATION RATE: 16 BRPM | OXYGEN SATURATION: 97 %

## 2025-07-10 DIAGNOSIS — G47.00 INSOMNIA, UNSPECIFIED TYPE: ICD-10-CM

## 2025-07-10 DIAGNOSIS — Z72.0 TOBACCO ABUSE: ICD-10-CM

## 2025-07-10 DIAGNOSIS — F41.9 ANXIETY DISORDER, UNSPECIFIED TYPE: Chronic | ICD-10-CM

## 2025-07-10 DIAGNOSIS — M54.9 CHRONIC NECK AND BACK PAIN: Primary | ICD-10-CM

## 2025-07-10 DIAGNOSIS — G89.29 CHRONIC NECK AND BACK PAIN: Primary | ICD-10-CM

## 2025-07-10 DIAGNOSIS — F12.90 MARIJUANA USER: ICD-10-CM

## 2025-07-10 DIAGNOSIS — M54.2 CHRONIC NECK AND BACK PAIN: Primary | ICD-10-CM

## 2025-07-10 DIAGNOSIS — M47.812 CERVICAL SPONDYLOSIS: ICD-10-CM

## 2025-07-10 DIAGNOSIS — G44.86 CERVICOGENIC HEADACHE: Chronic | ICD-10-CM

## 2025-07-10 DIAGNOSIS — H53.469 CVA, OLD, HOMONYMOUS HEMIANOPSIA: Chronic | ICD-10-CM

## 2025-07-10 DIAGNOSIS — I69.398 CVA, OLD, HOMONYMOUS HEMIANOPSIA: Chronic | ICD-10-CM

## 2025-07-10 DIAGNOSIS — G43.E11 CHRONIC MIGRAINE WITH AURA, INTRACTABLE, WITH STATUS MIGRAINOSUS: Chronic | ICD-10-CM

## 2025-07-10 PROCEDURE — 3044F HG A1C LEVEL LT 7.0%: CPT | Mod: CPTII,,, | Performed by: NURSE PRACTITIONER

## 2025-07-10 PROCEDURE — 3074F SYST BP LT 130 MM HG: CPT | Mod: CPTII,,, | Performed by: NURSE PRACTITIONER

## 2025-07-10 PROCEDURE — 3008F BODY MASS INDEX DOCD: CPT | Mod: CPTII,,, | Performed by: NURSE PRACTITIONER

## 2025-07-10 PROCEDURE — 1160F RVW MEDS BY RX/DR IN RCRD: CPT | Mod: CPTII,,, | Performed by: NURSE PRACTITIONER

## 2025-07-10 PROCEDURE — 99215 OFFICE O/P EST HI 40 MIN: CPT | Mod: PBBFAC | Performed by: NURSE PRACTITIONER

## 2025-07-10 PROCEDURE — 1159F MED LIST DOCD IN RCRD: CPT | Mod: CPTII,,, | Performed by: NURSE PRACTITIONER

## 2025-07-10 PROCEDURE — 3078F DIAST BP <80 MM HG: CPT | Mod: CPTII,,, | Performed by: NURSE PRACTITIONER

## 2025-07-10 PROCEDURE — 99215 OFFICE O/P EST HI 40 MIN: CPT | Mod: S$PBB,,, | Performed by: NURSE PRACTITIONER

## 2025-07-10 RX ORDER — TIZANIDINE 4 MG/1
4 TABLET ORAL 2 TIMES DAILY
Qty: 20 TABLET | Refills: 2 | Status: SHIPPED | OUTPATIENT
Start: 2025-07-10 | End: 2025-07-10 | Stop reason: SDUPTHER

## 2025-07-10 RX ORDER — GABAPENTIN 600 MG/1
600 TABLET ORAL 3 TIMES DAILY
Qty: 90 TABLET | Refills: 0 | Status: SHIPPED | OUTPATIENT
Start: 2025-07-10

## 2025-07-10 RX ORDER — FLUTICASONE PROPIONATE 50 MCG
1 SPRAY, SUSPENSION (ML) NASAL DAILY
Qty: 15.8 ML | Refills: 0 | Status: SHIPPED | OUTPATIENT
Start: 2025-07-10

## 2025-07-10 RX ORDER — TIZANIDINE 4 MG/1
4 TABLET ORAL 2 TIMES DAILY
Qty: 60 TABLET | Refills: 2 | Status: SHIPPED | OUTPATIENT
Start: 2025-07-10

## 2025-07-10 RX ORDER — AMLODIPINE BESYLATE 10 MG/1
10 TABLET ORAL DAILY
Qty: 90 TABLET | Refills: 3 | Status: CANCELLED | OUTPATIENT
Start: 2025-07-10 | End: 2025-10-08

## 2025-07-10 RX ORDER — FLUTICASONE PROPIONATE 50 MCG
1 SPRAY, SUSPENSION (ML) NASAL DAILY
Qty: 15.8 ML | Refills: 0 | Status: SHIPPED | OUTPATIENT
Start: 2025-07-10 | End: 2025-07-10

## 2025-07-10 RX ORDER — DULOXETIN HYDROCHLORIDE 60 MG/1
60 CAPSULE, DELAYED RELEASE ORAL 2 TIMES DAILY
Qty: 60 CAPSULE | Refills: 4 | Status: SHIPPED | OUTPATIENT
Start: 2025-07-10

## 2025-07-10 RX ORDER — TRAZODONE HYDROCHLORIDE 50 MG/1
50 TABLET ORAL NIGHTLY
Qty: 30 TABLET | Refills: 4 | Status: SHIPPED | OUTPATIENT
Start: 2025-07-10 | End: 2026-07-10

## 2025-07-10 NOTE — PROGRESS NOTES
"[facility] Neurology Initial Office Visit Note    Initial Visit Date: 4/10/2025  Last Visit Date:   Current Visit Date:  04/10/2025  Subjective:      Patient ID: Jo Ann Swan is a 65 y.o. female.    Chief Complaint: Migraine (Pt states migraines are daily)    HPI  This is a 64 YO  F w/a medical hx of HTN, hx of c-spine fusion, lumbar spondylosis, "spinal tumor removal L-spine late 80s", OA, abdominal hernia, anxiety, COPD, HLD, stress incontinence, chronic back pain with radiculopathy/opiate dependent, tobacco smoker who was referred for stroke and headache. Admits to snoring, daytime somnolence, naps during the day, occasionally awakens gasping for air. During last visit, duloxetine was increased to 30mg in AM and 60mg nightly, Ubrelvy 100mg PO BID PRN and tizanidine 4mg BID PRN neck pain were initiated. Home Sleep study was ordered, pending.    Today, Pt states she has chronic daily HA, denies migraine. Muscle spasm worsened with bad weather. Continues with daily neck pain that is worse with ROM. Tizanidine partially effective, but did not have enough to take routinely. Amenable to pain management, but cannot have steroids or NSAIDS due to allergy. Does not sleep well at night, amitriptyline ineffective. Amenable to pain management referral.    Presenting Hx:  Pt presented to the ED with complaint of right hand numbness, right visual field deficit and left-sided headache all started around 11:00 a.m. on 10/10/2023.  CT head + subacute ischemic infarct in the left PCA territory which was confirmed by MRI showing acute left PCA territory infarct without hemorrhage, CTA head and neck showed occluded proximal left PCA. Echo showed LVEF 55-60%, grade 1 diastolic dysfunction, negative bubble study. Her labs notable for urinalysis consistent with UTI with significantly elevated WBCs.  Evaluated by Neurology, initiated on aspirin and Plavix.  Dx w/atheroembolic PCA stroke. Continued w/blurred vision.  Recommend " "follow-up with Ophthalmology as an outpatient.  Last [facility] by [facility]18/[facility]    Age of Onset : 1987     Headache Description: located between neck and head and may radiate to either side of head or both sides, described as shooting/stabbing, occasional "light" around people or objects, photophobia/phonophobia, denies nausea, affects ADLs    Frequency: 30 headache days per month.     Provocation Factors: stress, lack of sleep    Risk Factors  - Family history of headache disorder: Yes mother  - History of focal CNS lesions: No  - History of CNS infections: Yes meningitis x 2 1989 (comatose 2  weeks and 2013)  - History head trauma: Yes in gymnastics as child  - History of underlying mood disorder: Yes anxiety  - History of sleep disorder: Yes insomnia  - Recreational drug use: yes; marijuana  - Tobacco use: Yes vaping  - Alcohol use: No  - Weight fluctuation: No  - Isotretinoin or Tetracycline use:  Unknown  - Family planning and contraceptive use: Not Applicable    Medications:     Current Prophylactic  Gabapentin 600mg PO TID (8/8/2024 - present) ineffective  Duloxetine 30mg PO in AM and 60mg PO QHS (2/7/2025 - present) partially effective for anxiety  Amlodipine 10mg PO Qday (8/8/2024  - present) ineffective for HA  HCTZ 12.5mg PO Qday (3/10/2025 - present)  Amitriptyline 25mg PO QHS (10/25/2024 - 4/7/2025) ineffective    Current Abortive  Tylenol - ineffective  Ubrelvy 100mg Po BID PRN (4/10/2025 - present) ineffective    Prior Prophylactic  none    Prior Abortive  Sumatriptan 100mg PO BID PRN (10/7/2024 - ?) effective but contraindicated in stroke  Tizanidine 4mg PO TID PRN (1/18/2024 - 3/10/2025) effective but out  Devices:     - VNS:  - TNS  - TMS:     Procedures:     - Botox:  - PSG block:   - Occipital nerve block:     Labs:     Results for orders placed or performed in visit on 03/10/25   Urine Culture High Risk    Collection Time: 03/10/25  8:54 AM    Specimen: Urine "   Result Value Ref Range    Urine Culture No Significant Growth    Urinalysis    Collection Time: 03/10/25  8:54 AM   Result Value Ref Range    Color, UA Yellow Yellow, Light-Yellow, Dark Yellow, Antoinette, Straw    Appearance, UA Clear Clear    Specific Gravity, UA 1.025 1.005 - 1.030    pH, UA 5.5 5.0 - 8.5    Protein, UA Trace (A) Negative    Glucose,  (A) Negative, Normal    Ketones, UA Negative Negative    Blood, UA Negative Negative    Bilirubin, UA Negative Negative    Urobilinogen, UA 1.0 0.2, 1.0, Normal    Nitrites, UA Negative Negative    Leukocyte Esterase, UA Trace (A) Negative   Urinalysis, Microscopic    Collection Time: 03/10/25  8:54 AM   Result Value Ref Range    Bacteria, UA None Seen None Seen, Rare, Occasional /HPF    Calcium Oxalate Crystals, UA Rare (A) None Seen    RBC, UA None Seen None Seen, 0-2, 3-5, 0-5 /HPF    WBC, UA None Seen None Seen, 0-2, 3-5, 0-5 /HPF    Squamous Epithelial Cells, UA Rare None Seen, Rare, Occasional, Occ /HPF   POCT URINE DIPSTICK WITHOUT MICROSCOPE    Collection Time: 03/10/25  9:00 AM   Result Value Ref Range    Glucose, UA neg     Bilirubin, POC neg     Ketones, UA neg     Spec Grav UA 1.015     Blood, UA neg     pH, UA 5.0     Protein, POC trace     Urobilinogen, UA 0.2     Nitrite, UA neg     WBC, UA trace     Color, UA Dark Yellow     Clarity, UA Slightly Cloudy        Studies:     - Echo 10/11/2023: L EF 55-60%; bubble negative  - CUS 10/11/2023: bilat carotid <50% stenosis  - MRI Brain w/out Ezekiel 10/11/2023: acute L PCA territory infarct; mild chronic microvascular ischemic changes  - CTA head/neck 10/11/2023: occluded proximal L PCA  - NCHCT 10/11/2023: subacute ischemic changes in L PCA territory  - Lumbar Puncture:  - MRA Head w/o Ezekiel:   - MRV Head w/o Ezekiel:   - Cerebral angiogram 1/4/2024: moderate stenosis of superior M2 branch of the L MCA, not flow limiting    Review of Systems    As per HPI. All other systems negative    Physical Exams:     Vitals:     07/10/25 1356   BP: 126/77   Pulse: 61   Resp: 16   Temp: 98.2 °F (36.8 °C)       Physical Exam  Constitutional:       Appearance: Normal appearance. She is normal weight.   HENT:      Head: Normocephalic and atraumatic.      Right Ear: External ear normal.      Left Ear: External ear normal.      Nose: Nose normal.      Mouth/Throat:      Mouth: Mucous membranes are moist.      Pharynx: Oropharynx is clear.   Eyes:      Conjunctiva/sclera: Conjunctivae normal.   Neck:      Comments: Painful ROM  Cardiovascular:      Rate and Rhythm: Normal rate.      Pulses: Normal pulses.   Pulmonary:      Effort: Pulmonary effort is normal.   Abdominal:      General: Abdomen is flat. There is no distension.      Tenderness: There is no guarding.   Musculoskeletal:         General: Normal range of motion.      Cervical back: Normal range of motion. Tenderness present.      Comments: Lower back pain that radiates bilaterally and down ext   Skin:     General: Skin is warm and dry.      Coloration: Skin is not jaundiced.      Findings: No lesion or rash.   Neurological:      Mental Status: She is alert.     Comprehensive Neurological Exam:  Mental Status: Alert Oriented to Self, Date, and Place. Naming, repetition, reading, and writing wnl. Comprehension wnl. No dysarthria.   CN II - XII: KATHRYN, No APD,  VA grossly intact to finger counting at 6 ft, VFFC w/exception R homonymous hemianopia, No ptosis OU, EOMI without nystagmus, LT/Temp symmetric in CN V1-3 distribution, Hearing grossly intact, Face Symmetric, Tongue and Uvula midline, Trapezius symmetric bilateral.   Motor: tone and bulk wnl throughout, no abnormal involuntary or voluntary movements, 5/5 to confrontation, Fine finger movements wnl b/l, No pronator drift.   Sensory: LT, Proprioception, Vibration, PP, Temp symmetric w/exception of numbness to R thigh. No sensory simultagnosia.   Reflexes: 2+ throughout  Cerebellar: FNF wnl b/l  Romberg: sway back and forth, no  "fall  Gait: normal, Bilat arm swing intact     Assessment:     This is 65 y.o. 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 Last seen by L[facility]Y Gimenez NP at [facility] 10/18/2024. Admits to snoring, daytime somnolence, naps during the day, occasionally awakens gasping for air. Symptoms indicative of cervicogenic HA with superimposing chronic migraine with aura, intractable with status. Utilizes TENS machine for neck pain. Pt states she has chronic daily HA, denies migraine. Muscle spasm worsened with bad weather. Continues with daily neck pain that is worse with ROM. Tizanidine partially effective, but did not have enough to take routinely. Amenable to pain management, but cannot have steroids or NSAIDS due to allergy. Does not sleep well at night, amitriptyline ineffective. Amenable to pain management referral. Admits to smoking marijuana.    1. Chronic neck and back pain  -     tiZANidine (ZANAFLEX) 4 MG tablet; Take 1 tablet (4 mg total) by mouth 2 (two) times daily.  Dispense: 60 tablet; Refill: 2  -     Cancel: Ambulatory referral/consult to Pain Clinic; Future; Expected date: 07/17/2025  -     Ambulatory referral/consult to Pain Clinic; Future; Expected date: 07/17/2025    2. Anxiety disorder, unspecified type  -     DULoxetine (CYMBALTA) 60 MG capsule; Take 1 capsule (60 mg total) by mouth 2 (two) times daily. 1 capsule in AM and 2 caps at night  Dispense: 60 capsule; Refill: 4    3. Insomnia, unspecified type  -     traZODone (DESYREL) 50 MG tablet; Take 1 tablet (50 mg total) by mouth every evening.  Dispense: 30 tablet; Refill: 4    4. Marijuana user    5. CVA, old, homonymous hemianopsia  Overview:  Patient had an atheroembolic PCA stroke " October 20, 2023.  Patient still has right-sided vision loss and unsteady gait at times.  She is followed by Neurology.      6. Chronic migraine with aura, intractable, with status migrainosus    7. Cervicogenic headache    8. Cervical spondylosis    9. Tobacco abuse      Plan:     [] increase duloxetine to 60mg PO BID as migraine preventative  [] d/c Ubrelvy 100mg PO BID PRN at onset of mgiraine as triptans are contraindicated in Pts w/stroke hx  [] Pt to call Home Sleep study  [] increase tizanidine 4mg BID neck pain  [] hold physical therapy for now; Pt not interested at this time  [] refer to [facility] pain clinic for neck and back pain    RTC 4 mths - TM okay    Headache education provided: good sleep hygiene and 7 hours of sleep per night, stress management, medication overuse education provided. Using more 3 OTC per week may worsen headaches, high intensity interval training has shown to reduce headache frequency. Low carb, high protein has shown to reduce headache frequency. Patient is instructed in keep headache diary.     Stroke Education Provided - including DASH diet, Blood Pressure Control < 130/90, HgA1c < 7.0%, LDL < 100, sleep apnea, and physical activity of at least 150 min per week    I have explained the treatment plan, diagnosis, and prognosis to patient. All questions are answered to the best of my knowledge.     Visit today is associated with current or anticipated ongoing medical care related to this patient's single serious condition/complex condition as documented above.     Face to face time 40 minutes, including documentation, chart review, counseling, education, review of test results, relevant medical records, and coordination of care.       ABRIL MatuteC  General Neurology  07/10/2025

## 2025-07-17 ENCOUNTER — PATIENT MESSAGE (OUTPATIENT)
Dept: FAMILY MEDICINE | Facility: CLINIC | Age: 65
End: 2025-07-17
Payer: MEDICAID

## 2025-07-21 ENCOUNTER — TELEPHONE (OUTPATIENT)
Dept: FAMILY MEDICINE | Facility: CLINIC | Age: 65
End: 2025-07-21
Payer: MEDICAID

## 2025-07-21 NOTE — TELEPHONE ENCOUNTER
Copied from CRM #[id number]. Topic: Appointments - Appointment Scheduling  >> Jul 21, 2025 11:57 AM Lizzette wrote:  .Who Called: Jo Ann Swan    Caller is requesting a sooner appointment. Caller declined first available appointment listed below. Caller will not accept being placed on the waitlist and is requesting a message be sent to doctor.    When is the first available appointment?n/a   Options offered (Virtual Visit, Urgent Care): in person   Symptoms:calling to schedule a fu appt none available in [facility] she was seen       Preferred Method of Contact: Phone Call  Patient's Preferred Phone Number on File: [phone]   Best Call Back Number, if different:  Additional Information:   Please text the pt when trying to reach out  >> Jul 21, 2025  1:00 PM Nurse Papo wrote:  >> Jul 21, 2025  1:00 PM Nurse Papo wrote:  Please schedule when you have a chance.

## 2025-07-28 ENCOUNTER — PATIENT MESSAGE (OUTPATIENT)
Dept: ADMINISTRATIVE | Facility: OTHER | Age: 65
End: 2025-07-28
Payer: MEDICAID

## 2025-07-29 ENCOUNTER — OFFICE VISIT (OUTPATIENT)
Dept: FAMILY MEDICINE | Facility: CLINIC | Age: 65
End: 2025-07-29
Payer: MEDICAID

## 2025-07-29 VITALS
SYSTOLIC BLOOD PRESSURE: 108 MMHG | BODY MASS INDEX: 23.56 KG/M2 | TEMPERATURE: 98 F | DIASTOLIC BLOOD PRESSURE: 66 MMHG | OXYGEN SATURATION: 98 % | RESPIRATION RATE: 15 BRPM | HEART RATE: 65 BPM | WEIGHT: 150.38 LBS

## 2025-07-29 DIAGNOSIS — M54.9 CHRONIC NECK AND BACK PAIN: ICD-10-CM

## 2025-07-29 DIAGNOSIS — G89.29 CHRONIC NECK AND BACK PAIN: ICD-10-CM

## 2025-07-29 DIAGNOSIS — Z12.11 SCREENING FOR COLORECTAL CANCER: ICD-10-CM

## 2025-07-29 DIAGNOSIS — M54.2 CHRONIC NECK AND BACK PAIN: ICD-10-CM

## 2025-07-29 DIAGNOSIS — Z23 ENCOUNTER FOR IMMUNIZATION: Primary | ICD-10-CM

## 2025-07-29 DIAGNOSIS — Z12.12 SCREENING FOR COLORECTAL CANCER: ICD-10-CM

## 2025-07-29 DIAGNOSIS — E78.2 MIXED HYPERLIPIDEMIA: ICD-10-CM

## 2025-07-29 DIAGNOSIS — I10 PRIMARY HYPERTENSION: ICD-10-CM

## 2025-07-29 PROCEDURE — 3044F HG A1C LEVEL LT 7.0%: CPT | Mod: CPTII,,,

## 2025-07-29 PROCEDURE — 3078F DIAST BP <80 MM HG: CPT | Mod: CPTII,,,

## 2025-07-29 PROCEDURE — 90677 PCV20 VACCINE IM: CPT | Mod: ,,,

## 2025-07-29 PROCEDURE — 3008F BODY MASS INDEX DOCD: CPT | Mod: CPTII,,,

## 2025-07-29 PROCEDURE — 90472 IMMUNIZATION ADMIN EACH ADD: CPT | Mod: ,,,

## 2025-07-29 PROCEDURE — 1159F MED LIST DOCD IN RCRD: CPT | Mod: CPTII,,,

## 2025-07-29 PROCEDURE — 1160F RVW MEDS BY RX/DR IN RCRD: CPT | Mod: CPTII,,,

## 2025-07-29 PROCEDURE — 90471 IMMUNIZATION ADMIN: CPT | Mod: ,,,

## 2025-07-29 PROCEDURE — 3074F SYST BP LT 130 MM HG: CPT | Mod: CPTII,,,

## 2025-07-29 PROCEDURE — 90715 TDAP VACCINE 7 YRS/> IM: CPT | Mod: ,,,

## 2025-07-29 PROCEDURE — G2211 COMPLEX E/M VISIT ADD ON: HCPCS | Mod: ,,,

## 2025-07-29 PROCEDURE — 99214 OFFICE O/P EST MOD 30 MIN: CPT | Mod: 25,,,

## 2025-07-29 RX ORDER — LIDOCAINE 50 MG/G
1 PATCH TOPICAL DAILY
Qty: 30 PATCH | Refills: 0 | Status: SHIPPED | OUTPATIENT
Start: 2025-07-29

## 2025-07-29 RX ORDER — GABAPENTIN 600 MG/1
600 TABLET ORAL 3 TIMES DAILY
Qty: 90 TABLET | Refills: 0 | Status: SHIPPED | OUTPATIENT
Start: 2025-07-29

## 2025-07-29 RX ORDER — ATORVASTATIN CALCIUM 40 MG/1
40 TABLET, FILM COATED ORAL NIGHTLY
Qty: 30 TABLET | Refills: 1 | Status: SHIPPED | OUTPATIENT
Start: 2025-07-29

## 2025-07-29 NOTE — ASSESSMENT & PLAN NOTE
Well-controlled and at goal.  Continue Lipitor 40 mg HS    Lab Results   Component Value Date    TRIG 93 03/06/2025    HDL 47 03/06/2025    TOTALCHOLEST 3 03/06/2025    LDLCALC 68.00 03/06/2025     Educated on importance of dietary modifications. Follow a low cholesterol, low saturated fat diet with less that 200mg of cholesterol a day.  Avoid fried foods and high saturated fats (high saturated fats less than 7% of calories).  Increase dietary fiber.  Regular exercise can reduce LDL and raise HDL. Stressed importance of physical activity 5 times per week for 30 minutes per day.

## 2025-07-29 NOTE — ASSESSMENT & PLAN NOTE
Well-controlled.  Continue above regimen    AHA/ACC goal <130/80. JNC8 goal <140/90 (all ages if DM or CKD OR <60), <150/90 (>60 w/o CKD or DM)  Low Sodium Diet (DASH Diet - Less than 2 grams of sodium per day).  Monitor blood pressure daily and log. Report consistent numbers greater than 140/90.  Maintain healthy weight with goal BMI <30. Exercise 30 minutes per day, 5 days per week.  Vaping cessation encouraged

## 2025-07-29 NOTE — PROGRESS NOTES
[facility]  Jody Torrez MD    Patient ID: [id number]     Chief Complaint: Follow-up      HPI:     Jo Ann Swan is a 65 y.o. female with h/o PCA stroke 2023, cervical spondylosis, anxiety, hypertension, COPD here today for follow-up of chronic conditions.      HCTZ added last visit for elevated blood pressure.   Blood pressure 108/66 in clinic today.     Return neck pain has been worse recently.  She recently saw her neurologist who referred her to pain management.  She is awaiting an appointment.  Currently taking gabapentin 600 mg TID, tizanidine 4 mg BID,  Cymbalta 60 mg BID.        Past Medical History:   Diagnosis Date    Allergies     Anemia, unspecified     Anxiety disorder, unspecified     Arthritis     Carpal tunnel syndrome 02/07/2025    Chronic neck and back pain     COPD (chronic obstructive pulmonary disease)     Hepatitis B virus infection 02/07/2025    HTN (hypertension)     Meningitis spinal     Meningitis spinal     Mixed hyperlipidemia     Stroke         Past Surgical History:   Procedure Laterality Date    CHOLECYSTECTOMY      GANGLION CYST EXCISION      R wrist    HERNIA REPAIR      SPINE SURGERY      TUMOR REMOVAL          Social History     Tobacco Use    Smoking status: Every Day     Current packs/day: 0.50     Average packs/day: 0.5 packs/day for 50.6 years (25.3 ttl pk-yrs)     Types: Vaping with nicotine, Cigarettes     Start date: 1975    Smokeless tobacco: Never    Tobacco comments:     Vape currently with nicotine   Substance and Sexual Activity    Alcohol use: Never    Drug use: Yes     Types: Marijuana    Sexual activity: Not Currently        Current Outpatient Medications   Medication Instructions    amLODIPine (NORVASC) 10 mg, Oral, Daily    atorvastatin (LIPITOR) 40 mg, Oral, Nightly    clopidogreL (PLAVIX) 75 mg, Oral, Daily    dextromethorphan-guaiFENesin  mg (MUCINEX DM)  mg per 12 hr tablet 1 tablet, 2 times daily PRN    DULoxetine (CYMBALTA)  60 mg, Oral, 2 times daily, 1 capsule in AM and 2 caps at night    gabapentin (NEURONTIN) 600 mg, Oral, 3 times daily    hydroCHLOROthiazide 12.5 mg, Oral, Daily    LIDOcaine (LIDODERM) 5 % 1 patch, Transdermal, Daily, Remove & Discard patch within 12 hours or as directed by MD    tiZANidine (ZANAFLEX) 4 mg, Oral, 2 times daily    traZODone (DESYREL) 50 mg, Oral, Nightly       Review of patient's allergies indicates:   Allergen Reactions    Nsaids (non-steroidal anti-inflammatory drug) Swelling    Cortisone Other (See Comments)    Mold     Naproxen     Pollen extracts     Prednisone Other (See Comments)        Patient Care Team:  Jody Torrez MD as PCP - General (Family Medicine)  Karissa Gimenez FNP as Nurse Practitioner (Neurology)  Cassy Alex MD as Consulting Physician (Ophthalmology)     Subjective:     Review of Systems    12 point review of systems conducted, negative except as stated in the history of present illness. See HPI for details.    Objective:     Visit Vitals  /66 (Patient Position: Sitting)   Pulse 65   Temp 98 °F (36.7 °C) (Oral)   Resp 15   Wt 68.2 kg (150 lb 6.4 oz)   SpO2 98%   BMI 23.56 kg/m²       Physical Exam  Vitals and nursing note reviewed.   Constitutional:       General: She is not in acute distress.     Appearance: She is not ill-appearing.   HENT:      Head: Normocephalic and atraumatic.   Eyes:      General: No scleral icterus.     Extraocular Movements: Extraocular movements intact.      Conjunctiva/sclera: Conjunctivae normal.   Cardiovascular:      Rate and Rhythm: Normal rate and regular rhythm.      Heart sounds: No murmur heard.     No friction rub. No gallop.   Pulmonary:      Effort: Pulmonary effort is normal. No respiratory distress.      Breath sounds: Normal breath sounds. No wheezing, rhonchi or rales.   Musculoskeletal:         General: Normal range of motion.      Right lower leg: No edema.      Left lower leg: No edema.   Skin:     General: Skin is  warm and dry.   Neurological:      General: No focal deficit present.      Mental Status: She is alert.   Psychiatric:         Mood and Affect: Mood normal.         Labs Reviewed:     Chemistry:  Lab Results   Component Value Date     03/06/2025    K 4.4 03/06/2025    BUN 11.6 03/06/2025    CREATININE 0.79 03/06/2025    EGFRNORACEVR >60 03/06/2025    CALCIUM 9.8 03/06/2025    ALKPHOS 98 03/06/2025    ALBUMIN 4.1 03/06/2025    BILIDIR 0.00 05/24/2019    IBILI 0.30 05/24/2019    AST 17 03/06/2025    ALT 19 03/06/2025    MG 2.00 10/12/2023    PHOS 3.8 10/12/2023    TSH 1.419 03/06/2025        Lab Results   Component Value Date    HGBA1C 5.2 03/06/2025        Hematology:  Lab Results   Component Value Date    WBC 10.69 03/06/2025    HGB 13.5 03/06/2025    HCT 41.3 03/06/2025     03/06/2025       Lipid Panel:  Lab Results   Component Value Date    CHOL 134 03/06/2025    HDL 47 03/06/2025    TRIG 93 03/06/2025    TOTALCHOLEST 3 03/06/2025        Urine:  Lab Results   Component Value Date    APPEARANCEUA Clear 03/10/2025    SGUA 1.025 03/10/2025    PROTEINUA Trace (A) 03/10/2025    KETONESUA Negative 03/10/2025    LEUKOCYTESUR Trace (A) 03/10/2025    RBCUA None Seen 03/10/2025    WBCUA None Seen 03/10/2025    BACTERIA None Seen 03/10/2025    SQEPUA Trace 06/22/2024        Assessment:       ICD-10-CM ICD-9-CM   1. Encounter for immunization  Z23 V03.89   2. Chronic neck and back pain  M54.2 723.1    M54.9 724.5    G89.29 338.29   3. Mixed hyperlipidemia  E78.2 272.2   4. Screening for colorectal cancer  Z12.11 V76.51    Z12.12 V76.41   5. Primary hypertension  I10 401.9        Plan:     1. Encounter for immunization  -     pneumoc 20-leroy conj-dip cr(PF) (PREVNAR-20 (PF)) injection Syrg 0.5 mL  -     Discontinue: Tdap (BOOSTRIX) vaccine injection 0.5 mL  -     Discontinue: pneumoc 20-leroy conj-dip cr(PF) (PREVNAR-20 (PF)) injection Syrg 0.5 mL  -     DIPH,PERTUSS(ACEL),TET VAC(PF)(ADULT)(ADACEL)(TDaP)    2.  Chronic neck and back pain  Assessment & Plan:   Patient will flare for chronic neck pain.  Awaiting pain management appointment.  Continue gabapentin 600 mg TID, tizanidine 4 mg b.I.d., and Cymbalta 60 mg BID.   We will try adding lidocaine patches    Orders:  -     gabapentin (NEURONTIN) 600 MG tablet; Take 1 tablet (600 mg total) by mouth 3 (three) times daily.  Dispense: 90 tablet; Refill: 0    3. Mixed hyperlipidemia  Overview:  Lipitor 40 mg HS    Assessment & Plan:  Well-controlled and at goal.  Continue Lipitor 40 mg HS    Lab Results   Component Value Date    TRIG 93 03/06/2025    HDL 47 03/06/2025    TOTALCHOLEST 3 03/06/2025    LDLCALC 68.00 03/06/2025     Educated on importance of dietary modifications. Follow a low cholesterol, low saturated fat diet with less that 200mg of cholesterol a day.  Avoid fried foods and high saturated fats (high saturated fats less than 7% of calories).  Increase dietary fiber.  Regular exercise can reduce LDL and raise HDL. Stressed importance of physical activity 5 times per week for 30 minutes per day.       Orders:  -     atorvastatin (LIPITOR) 40 MG tablet; Take 1 tablet (40 mg total) by mouth every evening.  Dispense: 30 tablet; Refill: 1    4. Screening for colorectal cancer  -     Cologuard Screening (Multitarget Stool DNA); Future; Expected date: 07/29/2025    5. Primary hypertension  Overview:  Amlodipine 10 mg daily  Hydrochlorothiazide 12.5 mg daily    Assessment & Plan:   Well-controlled.  Continue above regimen    AHA/ACC goal <130/80. JNC8 goal <140/90 (all ages if DM or CKD OR <60), <150/90 (>60 w/o CKD or DM)  Low Sodium Diet (DASH Diet - Less than 2 grams of sodium per day).  Monitor blood pressure daily and log. Report consistent numbers greater than 140/90.  Maintain healthy weight with goal BMI <30. Exercise 30 minutes per day, 5 days per week.  Vaping cessation encouraged      Other orders  -     LIDOcaine (LIDODERM) 5 %; Place 1 patch onto the skin  once daily. Remove & Discard patch within 12 hours or as directed by MD  Dispense: 30 patch; Refill: 0         Follow up in about 3 months (around 10/29/2025). In addition to their scheduled follow up, the patient has also been instructed to follow up on as needed basis.     Future Appointments   Date Time Provider Department Center   9/9/2025  1:20 PM PROVIDERS, [facility] [place]    10/30/2025  9:00 AM Jody Torrez MD [facility]   1/12/2026  1:00 PM Carola Palafox, ANP [facility] NEUR[facility] [place]         Jody Torrez MD

## 2025-07-29 NOTE — ASSESSMENT & PLAN NOTE
Patient will flare for chronic neck pain.  Awaiting pain management appointment.  Continue gabapentin 600 mg TID, tizanidine 4 mg b.I.d., and Cymbalta 60 mg BID.   We will try adding lidocaine patches

## 2025-07-31 ENCOUNTER — PATIENT MESSAGE (OUTPATIENT)
Facility: CLINIC | Age: 65
End: 2025-07-31
Payer: MEDICAID

## 2025-08-07 ENCOUNTER — PATIENT MESSAGE (OUTPATIENT)
Facility: CLINIC | Age: 65
End: 2025-08-07
Payer: MEDICAID